# Patient Record
Sex: MALE | Race: WHITE | NOT HISPANIC OR LATINO | Employment: FULL TIME | URBAN - METROPOLITAN AREA
[De-identification: names, ages, dates, MRNs, and addresses within clinical notes are randomized per-mention and may not be internally consistent; named-entity substitution may affect disease eponyms.]

---

## 2020-02-07 ENCOUNTER — OFFICE VISIT (OUTPATIENT)
Dept: FAMILY MEDICINE CLINIC | Facility: CLINIC | Age: 45
End: 2020-02-07
Payer: COMMERCIAL

## 2020-02-07 VITALS
TEMPERATURE: 98.2 F | HEIGHT: 72 IN | SYSTOLIC BLOOD PRESSURE: 150 MMHG | RESPIRATION RATE: 18 BRPM | DIASTOLIC BLOOD PRESSURE: 92 MMHG | HEART RATE: 72 BPM | WEIGHT: 259 LBS | BODY MASS INDEX: 35.08 KG/M2

## 2020-02-07 DIAGNOSIS — Z13.1 SCREENING FOR DIABETES MELLITUS: ICD-10-CM

## 2020-02-07 DIAGNOSIS — C43.30 MALIGNANT MELANOMA OF FACE (HCC): ICD-10-CM

## 2020-02-07 DIAGNOSIS — E66.9 OBESITY (BMI 35.0-39.9 WITHOUT COMORBIDITY): ICD-10-CM

## 2020-02-07 DIAGNOSIS — R10.11 RIGHT UPPER QUADRANT PAIN: ICD-10-CM

## 2020-02-07 DIAGNOSIS — R03.0 ELEVATED BP WITHOUT DIAGNOSIS OF HYPERTENSION: ICD-10-CM

## 2020-02-07 DIAGNOSIS — Z13.6 SCREENING FOR CARDIOVASCULAR CONDITION: ICD-10-CM

## 2020-02-07 DIAGNOSIS — Z11.4 SCREENING FOR HIV (HUMAN IMMUNODEFICIENCY VIRUS): ICD-10-CM

## 2020-02-07 DIAGNOSIS — Z00.00 ANNUAL PHYSICAL EXAM: Primary | ICD-10-CM

## 2020-02-07 PROBLEM — R94.6 ABNORMAL FINDING ON THYROID FUNCTION TEST: Status: ACTIVE | Noted: 2020-02-07

## 2020-02-07 PROCEDURE — 99386 PREV VISIT NEW AGE 40-64: CPT | Performed by: FAMILY MEDICINE

## 2020-02-07 NOTE — PATIENT INSTRUCTIONS

## 2020-02-07 NOTE — ASSESSMENT & PLAN NOTE
BMI Counseling: Body mass index is 35 62 kg/m²  The BMI is above normal  Nutrition recommendations include reducing portion sizes, decreasing overall calorie intake and 3-5 servings of fruits/vegetables daily  Exercise recommendations include moderate aerobic physical activity for 150 minutes/week
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
## 2020-02-14 ENCOUNTER — HOSPITAL ENCOUNTER (OUTPATIENT)
Dept: ULTRASOUND IMAGING | Facility: CLINIC | Age: 45
Discharge: HOME | End: 2020-02-14
Payer: COMMERCIAL

## 2020-02-14 DIAGNOSIS — R10.11 RIGHT UPPER QUADRANT PAIN: ICD-10-CM

## 2020-02-14 PROCEDURE — 76700 US EXAM ABDOM COMPLETE: CPT

## 2020-02-18 LAB
ALBUMIN SERPL-MCNC: 4.5 G/DL (ref 3.6–5.1)
ALBUMIN/GLOB SERPL: 1.8 (CALC) (ref 1–2.5)
ALP SERPL-CCNC: 60 U/L (ref 36–130)
ALT SERPL-CCNC: 34 U/L (ref 9–46)
APPEARANCE UR: CLEAR
AST SERPL-CCNC: 16 U/L (ref 10–40)
BASOPHILS # BLD AUTO: 43 CELLS/UL (ref 0–200)
BASOPHILS NFR BLD AUTO: 0.5 %
BILIRUB SERPL-MCNC: 0.7 MG/DL (ref 0.2–1.2)
BILIRUB UR QL STRIP: NEGATIVE
BUN SERPL-MCNC: 17 MG/DL (ref 7–25)
BUN/CREAT SERPL: NORMAL (CALC) (ref 6–22)
CALCIUM SERPL-MCNC: 9.4 MG/DL (ref 8.6–10.3)
CHLORIDE SERPL-SCNC: 105 MMOL/L (ref 98–110)
CHOLEST SERPL-MCNC: 172 MG/DL
CHOLEST/HDLC SERPL: 5.2 (CALC)
CO2 SERPL-SCNC: 27 MMOL/L (ref 20–32)
COLOR UR: YELLOW
CREAT SERPL-MCNC: 1.19 MG/DL (ref 0.6–1.35)
EOSINOPHIL # BLD AUTO: 102 CELLS/UL (ref 15–500)
EOSINOPHIL NFR BLD AUTO: 1.2 %
ERYTHROCYTE [DISTWIDTH] IN BLOOD BY AUTOMATED COUNT: 13 % (ref 11–15)
GLOBULIN SER CALC-MCNC: 2.5 G/DL (CALC) (ref 1.9–3.7)
GLUCOSE SERPL-MCNC: 82 MG/DL (ref 65–99)
GLUCOSE UR QL STRIP: NEGATIVE
HCT VFR BLD AUTO: 44.8 % (ref 38.5–50)
HDLC SERPL-MCNC: 33 MG/DL
HGB BLD-MCNC: 15.4 G/DL (ref 13.2–17.1)
HGB UR QL STRIP: NEGATIVE
HIV 1+2 AB+HIV1 P24 AG SERPL QL IA: NORMAL
KETONES UR QL STRIP: NEGATIVE
LDLC SERPL CALC-MCNC: 114 MG/DL (CALC)
LEUKOCYTE ESTERASE UR QL STRIP: NEGATIVE
LIPASE SERPL-CCNC: 14 U/L (ref 7–60)
LYMPHOCYTES # BLD AUTO: 2202 CELLS/UL (ref 850–3900)
LYMPHOCYTES NFR BLD AUTO: 25.9 %
MCH RBC QN AUTO: 30.7 PG (ref 27–33)
MCHC RBC AUTO-ENTMCNC: 34.4 G/DL (ref 32–36)
MCV RBC AUTO: 89.4 FL (ref 80–100)
MONOCYTES # BLD AUTO: 680 CELLS/UL (ref 200–950)
MONOCYTES NFR BLD AUTO: 8 %
NEUTROPHILS # BLD AUTO: 5474 CELLS/UL (ref 1500–7800)
NEUTROPHILS NFR BLD AUTO: 64.4 %
NITRITE UR QL STRIP: NEGATIVE
NONHDLC SERPL-MCNC: 139 MG/DL (CALC)
PH UR STRIP: 6 [PH] (ref 5–8)
PLATELET # BLD AUTO: 267 THOUSAND/UL (ref 140–400)
PMV BLD REES-ECKER: 9.8 FL (ref 7.5–12.5)
POTASSIUM SERPL-SCNC: 4.3 MMOL/L (ref 3.5–5.3)
PROT SERPL-MCNC: 7 G/DL (ref 6.1–8.1)
PROT UR QL STRIP: NEGATIVE
RBC # BLD AUTO: 5.01 MILLION/UL (ref 4.2–5.8)
SL AMB EGFR AFRICAN AMERICAN: 84 ML/MIN/1.73M2
SL AMB EGFR NON AFRICAN AMERICAN: 73 ML/MIN/1.73M2
SODIUM SERPL-SCNC: 140 MMOL/L (ref 135–146)
SP GR UR STRIP: 1.01 (ref 1–1.03)
TRIGL SERPL-MCNC: 136 MG/DL
TSH SERPL-ACNC: 3.38 MIU/L (ref 0.4–4.5)
WBC # BLD AUTO: 8.5 THOUSAND/UL (ref 3.8–10.8)

## 2020-02-19 ENCOUNTER — TELEPHONE (OUTPATIENT)
Dept: FAMILY MEDICINE CLINIC | Facility: CLINIC | Age: 45
End: 2020-02-19

## 2020-02-19 DIAGNOSIS — R16.2 HEPATOMEGALY WITH SPLENOMEGALY, NOT ELSEWHERE CLASSIFIED: Primary | ICD-10-CM

## 2020-02-19 DIAGNOSIS — R93.5 ABNORMAL US (ULTRASOUND) OF ABDOMEN: ICD-10-CM

## 2020-02-19 NOTE — TELEPHONE ENCOUNTER
RADIOLOGY CALLED AND ASKED THAT YOU LOOK AT [facility] U/S OF THE ABDOMEN, THERE ARE SIGNIFICANT FINDINGS

## 2020-03-04 ENCOUNTER — CONSULT (OUTPATIENT)
Dept: GASTROENTEROLOGY | Facility: CLINIC | Age: 45
End: 2020-03-04
Payer: COMMERCIAL

## 2020-03-04 ENCOUNTER — OFFICE VISIT (OUTPATIENT)
Dept: URGENT CARE | Facility: CLINIC | Age: 45
End: 2020-03-04
Payer: COMMERCIAL

## 2020-03-04 ENCOUNTER — TELEPHONE (OUTPATIENT)
Dept: GASTROENTEROLOGY | Facility: CLINIC | Age: 45
End: 2020-03-04

## 2020-03-04 VITALS
SYSTOLIC BLOOD PRESSURE: 150 MMHG | DIASTOLIC BLOOD PRESSURE: 88 MMHG | HEART RATE: 66 BPM | BODY MASS INDEX: 36.26 KG/M2 | WEIGHT: 259 LBS | HEIGHT: 71 IN

## 2020-03-04 VITALS
RESPIRATION RATE: 16 BRPM | WEIGHT: 259 LBS | HEIGHT: 72 IN | HEART RATE: 58 BPM | DIASTOLIC BLOOD PRESSURE: 74 MMHG | SYSTOLIC BLOOD PRESSURE: 119 MMHG | BODY MASS INDEX: 35.08 KG/M2 | OXYGEN SATURATION: 97 % | TEMPERATURE: 98.5 F

## 2020-03-04 DIAGNOSIS — R10.11 RIGHT UPPER QUADRANT PAIN: Primary | ICD-10-CM

## 2020-03-04 DIAGNOSIS — R16.0 HEPATOMEGALY: ICD-10-CM

## 2020-03-04 DIAGNOSIS — R16.2 HEPATOMEGALY WITH SPLENOMEGALY, NOT ELSEWHERE CLASSIFIED: ICD-10-CM

## 2020-03-04 DIAGNOSIS — R93.5 ABNORMAL US (ULTRASOUND) OF ABDOMEN: ICD-10-CM

## 2020-03-04 DIAGNOSIS — H10.32 ACUTE BACTERIAL CONJUNCTIVITIS OF LEFT EYE: Primary | ICD-10-CM

## 2020-03-04 PROCEDURE — 3008F BODY MASS INDEX DOCD: CPT | Performed by: INTERNAL MEDICINE

## 2020-03-04 PROCEDURE — 99214 OFFICE O/P EST MOD 30 MIN: CPT | Performed by: INTERNAL MEDICINE

## 2020-03-04 PROCEDURE — G0382 LEV 3 HOSP TYPE B ED VISIT: HCPCS | Performed by: PREVENTIVE MEDICINE

## 2020-03-04 PROCEDURE — 1036F TOBACCO NON-USER: CPT | Performed by: INTERNAL MEDICINE

## 2020-03-04 RX ORDER — POLYMYXIN B SULFATE AND TRIMETHOPRIM 1; 10000 MG/ML; [USP'U]/ML
1 SOLUTION OPHTHALMIC EVERY 4 HOURS
Qty: 10 ML | Refills: 0 | Status: SHIPPED | OUTPATIENT
Start: 2020-03-04 | End: 2020-03-11

## 2020-03-04 NOTE — PROGRESS NOTES
[facility] - Outpatient Consultation  Lavetta Frankel 54 y o  male MRN: [id number]  Encounter: [id number]    ASSESSMENT AND PLAN:      1  Hepatomegaly with splenomegaly, not elsewhere classified  Abnormal abdominal ultrasound that showed hepatosplenomegaly and heterogenous liver with possible fatty liver  LFTs within normal range  Indication was for right upper quadrant abdominal pain that has been episodic for many years  Suspect fatty liver  Exclude a viral, autoimmune or metabolic etiology  Radiology advise a CT scan for better visualization     - Hepatitis B surface antibody; Future  - Hepatitis A antibody, total; Future  - Hepatitis B surface antigen; Future  - ANEUDY Screen w/ Reflex to Titer/Pattern; Future  - Anti-smooth muscle antibody, IgG; Future  - Antimitochondrial antibody; Future  - Ceruloplasmin; Future  - Iron, TIBC and Ferritin Panel; Future  - Alpha-1-antitrypsin; Future  - Hepatitis C antibody; Future  - Liver Fibrosis, Fibrotest Actitest Panel; Future  - CT abdomen pelvis w contrast; Future  - continue to abstain from alcohol  - weight reduction through diet exercise        2  Right upper quadrant pain  Chronic, intermittent right upper quadrant abdominal pain over the past 2 years  Interestingly, he has stopped drinking beer over the past 3 weeks and has noted a significant improvement in the pain  Described as more of a constant pressure sensation  Recent abdominal ultrasound showed the absence of gallstones  Consider biliary dyskinesia, alcohol related duodenitis or gastritis versus peptic ulcer disease  Exclude pancreatitis  Unlikely choledocholithiasis in the absence of elevated LFTs      - check HIDA scan  - will arrange for an upper endoscopy        Followup Appointment:  4- 6 weeks  ______________________________________________________________________    Chief Complaint   Patient presents with    Follow-up     slub us        HPI:   Gladys Sanches Gary Birch is a 54y o  year old male who presents for an abnormal abdominal ultrasound that was indicated for chronic right upper quadrant abdominal pain  The ultrasound showed no evidence of gallstones  Artifact versus small area of adenomyomatosis, hepatomegaly ends splenomegaly and possible fatty liver or focal sparing  Intermittent episodes of right upper quadrant abdominal pain for at least 2 years  He did had any previous precipitating or alleviating factors up until recently when he noticed the pain significantly improved after he discontinued alcohol  He previously drank 3 beers a day 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  Denies NSAIDs  Denies actual heartburn, early satiety, nausea, vomiting, anorexia, weight loss, melena or rectal bleeding  Denies any history of IV drug use, jaundice, hepatitis contacts, intranasal cocaine or tattoos  Denies any significant weight changes over the years  Historical Information   Past Medical History:   Diagnosis Date    Known health problems: none      History reviewed  No pertinent surgical history  Social History     Substance and Sexual Activity   Alcohol Use Yes    Alcohol/week: 2 0 standard drinks    Types: 2 Cans of beer per week    Frequency: 4 or more times a week    Drinks per session: 1 or 2     Social History     Substance and Sexual Activity   Drug Use Not on file    Comment: Denies use     Social History     Tobacco Use   Smoking Status Never Smoker   Smokeless Tobacco Never Used     Family History   Problem Relation Age of Onset    No Known Problems Mother     Diabetes Father     No Known Problems Sister     Colorectal Cancer Neg Hx     Colon polyps Neg Hx        Meds/Allergies   No current outpatient medications on file  No Known Allergies    PHYSICAL EXAM:    Blood pressure 150/88, pulse 66, height 5' 11" (1 803 m), weight 117 kg (259 lb)  Body mass index is 36 12 kg/m²    General Appearance: NAD, cooperative, alert  Eyes: Anicteric  ENT: Normocephalic, atraumatic, normal mucosa  Neck:  Supple, symmetrical, trachea midline,   Resp:  Clear to auscultation bilaterally; no rales, rhonchi or wheezing; respirations unlabored   CV:  S1 S2, Regular rate and rhythm; no murmur, rub, or gallop  GI:  Soft, non-tender, non-distended; normal bowel sounds; no masses, no organomegaly   Rectal: Deferred  Musculoskeletal: No cyanosis, clubbing or edema  Normal ROM  Skin:  No jaundice, rashes, or lesions   Heme/Lymph: No palpable cervical lymphadenopathy  Psych: Normal affect, good eye contact  Neuro: No gross deficits, AAOx3    Lab Results:   Lab Results   Component Value Date    WBC 8 5 02/14/2020    HGB 15 4 02/14/2020    HCT 44 8 02/14/2020    MCV 89 4 02/14/2020     02/14/2020     Lab Results   Component Value Date    K 4 3 02/14/2020     02/14/2020    CO2 27 02/14/2020    BUN 17 02/14/2020    CREATININE 1 19 02/14/2020    CALCIUM 9 4 02/14/2020    AST 16 02/14/2020    ALT 34 02/14/2020    ALKPHOS 60 02/14/2020     No results found for: IRON, TIBC, FERRITIN  Lab Results   Component Value Date    LIPASE 14 02/14/2020       Radiology Results:   Us Abdomen Complete    Result Date: 2/19/2020  Narrative: ABDOMEN ULTRASOUND, COMPLETE INDICATION:   R10 11: Right upper quadrant pain  COMPARISON: None TECHNIQUE:   Real-time ultrasound of the abdomen was performed with a curvilinear transducer with both volumetric sweeps and still imaging techniques  FINDINGS: PANCREAS:  The pancreas is mostly obscured by bowel gas  AORTA AND IVC:  The visualized aorta is unremarkable  The IVC is not well seen  LIVER: Size:  Mildly enlarged  The liver measures 19 3 cm in the midclavicular line  Contour:  Surface contour is smooth  Parenchyma:  Increased, heterogeneous echogenicity is seen diffusely with a suggestion of some nodular areas    This could represent fibrofatty change with areas obscured by body habitus however other pathology needs to be considered given the heterogeneity  No evidence of suspicious mass  Limited imaging of the main portal vein shows it to be patent and hepatopetal  BILIARY: No gallstone is seen  An isolated volumetric sweeps show some ringdown artifact  This could be technical although a tiny focus of adenomyomatosis could be present  No intrahepatic biliary dilatation  CBD measures 4 mm  No choledocholithiasis  KIDNEY: Right kidney measures 11 6 cm  Within normal limits  Left kidney measures 11 9 cm  An 8 mm echogenic focus noted peripherally in the left kidney as seen on volumetric sweep image 16 of series 6144 and may represent area of scarring versus an echogenic nodule  SPLEEN: Measures 14 3 cm  Mildly enlarged ASCITES:  None  Impression: No gallstone  Artifact versus small area of adenomyomatosis  Splenomegaly  Very heterogeneous, enlarged liver  Echogenic focus in the left kidney may represent a small fatty lesion or scar  Correlation with any acute viral illness is advised  Further evaluation with CT or MRI is advised  The study was marked in EPIC for significant notification with follow-up  Workstation performed: [id number]       REVIEW OF SYSTEMS:    CONSTITUTIONAL: Denies any fever, chills, rigors, and weight loss  HEENT: No earache or tinnitus  Denies hearing loss or visual disturbances  CARDIOVASCULAR: No chest pain  Positive for an irregular heart rate  RESPIRATORY: Denies any cough, hemoptysis  Positive for shortness of breath with exertion  GASTROINTESTINAL: As noted in the History of Present Illness  GENITOURINARY: No problems with urination  Denies any hematuria or dysuria  NEUROLOGIC: No dizziness or vertigo, denies headaches  MUSCULOSKELETAL: Denies any muscle or joint pain  SKIN: Denies skin rashes  Positive for facial itching  ENDOCRINE: Denies excessive thirst  Denies intolerance to heat or cold  PSYCHOSOCIAL: Denies depression or anxiety  Denies any recent memory loss

## 2020-03-04 NOTE — PROGRESS NOTES
NAME: Dayanna Yi is a 54 y o  male  : 1975    MRN: [id number]      Assessment and Plan   Acute bacterial conjunctivitis of left eye [H10 32]  1  Acute bacterial conjunctivitis of left eye  polymyxin b-trimethoprim (POLYTRIM) ophthalmic solution   Exam findings are consistent with bacterial conjunctivitis vs blepharitis  At this time will send Polytrim ophthalmic eyedrops to pharmacy  Administer medication as noted  Encourage good hand hygiene  Change pillow cases  Pt is contagious for 24 hours while on abx drops  Advise Pt to continue use of OTC Tylenol alternating with OTC Ibuprofen  If symptoms persist the next 2-3 days Pt should follow-up with PCP  Patient understands and agrees with treatment plans  Amairani Grijalva was seen today for eye problem  Diagnoses and all orders for this visit:    Acute bacterial conjunctivitis of left eye  -     polymyxin b-trimethoprim (POLYTRIM) ophthalmic solution; Administer 1 drop to the right eye every 4 (four) hours for 7 days        Patient Instructions   There are no Patient Instructions on file for this visit  Proceed to ER if symptoms worsen  Chief Complaint     Chief Complaint   Patient presents with    Eye Problem     Patient awoke this morning with Left Eye swelling and irritation         History of Present Illness     46yo Pt presents c/o eye redness  x 1 day  Pt reports eye redness and irritation  Pt admits swollen eyelids,   Pt denies gritty sensation, burning sensation, excessive tearing, itchy eyelids,  light sensitivity, blurred vision  Denies known injury to the eye  Denies trouble with vision  Denies eye pain or pain with eye movement  Denies redness or swelling around the eyes  Denies known contact with someone with pinkeye  Review of Systems   Review of Systems   Constitutional: Negative for chills, fatigue and fever  Eyes: Positive for redness  Negative for photophobia, pain, discharge, itching and visual disturbance  Respiratory: Negative  Cardiovascular: Negative  Neurological: Negative for headaches  Current Medications       Current Outpatient Medications:     polymyxin b-trimethoprim (POLYTRIM) ophthalmic solution, Administer 1 drop to the right eye every 4 (four) hours for 7 days, Disp: 10 mL, Rfl: 0    Current Allergies     Allergies as of 03/04/2020    (No Known Allergies)              Past Medical History:   Diagnosis Date    Known health problems: none     Patient denies medical problems 03/04/2020       No past surgical history on file  Family History   Problem Relation Age of Onset    No Known Problems Mother     Diabetes Father     No Known Problems Sister     Colorectal Cancer Neg Hx     Colon polyps Neg Hx          Medications have been verified  The following portions of the patient's history were reviewed and updated as appropriate: allergies, current medications, past family history, past medical history, past social history, past surgical history and problem list     Objective   /74   Pulse 58   Temp 98 5 °F (36 9 °C)   Resp 16   Ht 6' (1 829 m)   Wt 117 kg (259 lb)   SpO2 97%   BMI 35 13 kg/m²      Physical Exam     Physical Exam   Constitutional: He appears well-developed  No distress  Eyes: Pupils are equal, round, and reactive to light  EOM are normal  Lids are everted and swept, no foreign bodies found  Left conjunctiva is injected  Fundoscopic exam:       The right eye shows red reflex  The left eye shows red reflex  Cardiovascular: Normal rate, regular rhythm, normal heart sounds and intact distal pulses  Exam reveals no gallop and no friction rub  No murmur heard  Pulmonary/Chest: Effort normal and breath sounds normal  No stridor  No respiratory distress  He has no wheezes  He has no rales  He exhibits no tenderness  Nursing note and vitals reviewed        Nereida Crow PA-C

## 2020-03-10 ENCOUNTER — LAB REQUISITION (OUTPATIENT)
Dept: LAB | Facility: HOSPITAL | Age: 45
End: 2020-03-10
Payer: COMMERCIAL

## 2020-03-10 DIAGNOSIS — R10.11 RIGHT UPPER QUADRANT PAIN: ICD-10-CM

## 2020-03-10 PROCEDURE — 88305 TISSUE EXAM BY PATHOLOGIST: CPT | Performed by: PATHOLOGY

## 2020-03-11 ENCOUNTER — TELEPHONE (OUTPATIENT)
Dept: GASTROENTEROLOGY | Facility: CLINIC | Age: 45
End: 2020-03-11

## 2020-03-11 NOTE — TELEPHONE ENCOUNTER
Pt was ordered ct abd and pelvis with contrast additional info was faxed to [employer] on pt's behalf  precert denied per [employer]  Please advise

## 2020-03-12 NOTE — TELEPHONE ENCOUNTER
Left message on patient's answering machine that we need to do a HIDA before insurance will cover CT scan of the abdomen    Told him to call us back with questions

## 2020-03-17 ENCOUNTER — HOSPITAL ENCOUNTER (OUTPATIENT)
Dept: NUCLEAR MEDICINE | Facility: HOSPITAL | Age: 45
Discharge: HOME | End: 2020-03-17
Payer: COMMERCIAL

## 2020-03-17 DIAGNOSIS — R16.2 HEPATOMEGALY WITH SPLENOMEGALY, NOT ELSEWHERE CLASSIFIED: ICD-10-CM

## 2020-03-17 PROCEDURE — A9537 TC99M MEBROFENIN: HCPCS

## 2020-03-17 PROCEDURE — 78227 HEPATOBIL SYST IMAGE W/DRUG: CPT

## 2020-03-17 RX ADMIN — SINCALIDE 2.3 MCG: 5 INJECTION, POWDER, LYOPHILIZED, FOR SOLUTION INTRAVENOUS at 09:16

## 2020-03-18 ENCOUNTER — TELEPHONE (OUTPATIENT)
Dept: GASTROENTEROLOGY | Facility: CLINIC | Age: 45
End: 2020-03-18

## 2020-03-18 NOTE — TELEPHONE ENCOUNTER
Patient notified that CT scan cancelled at this time since we have not received precert approval to date  I contacted scheduling and spoke with [address]   Patient informed that we will notify him of decision and if approved will reorder test

## 2020-03-18 NOTE — TELEPHONE ENCOUNTER
I spoke with patient and informed him that we have resubmitted records to see if insurance will approve CT scan, so far it is pending   If it is approved he can complete as scheduled tomorrow, if not we will cancel test

## 2020-03-18 NOTE — TELEPHONE ENCOUNTER
[facility] tomorrow because note from Dr office said it was denied but he had a call saying it was scheduled   # [phone]

## 2020-03-19 ENCOUNTER — TELEPHONE (OUTPATIENT)
Dept: GASTROENTEROLOGY | Facility: CLINIC | Age: 45
End: 2020-03-19

## 2020-03-19 DIAGNOSIS — R16.2 HEPATOMEGALY WITH SPLENOMEGALY, NOT ELSEWHERE CLASSIFIED: Primary | ICD-10-CM

## 2020-03-19 NOTE — TELEPHONE ENCOUNTER
I spoke with Jocy Perkins at [facility] approved #[id number] effective date 3/19/20-09/15/2020  Patient notified and he will call to schedule

## 2020-03-19 NOTE — TELEPHONE ENCOUNTER
Per Faith Mcfarlane with [facility], ct abd/pelvis denied  Not medically necessary  Per their recommendation, Ct abd with contrast is appropriate  If you agree, you can call them at [phone] option 4   Please reference case # [id number]

## 2020-03-20 LAB
A1AT SERPL-MCNC: 115 MG/DL (ref 83–199)
A2 MACROGLOB SERPL-MCNC: 98 MG/DL (ref 106–279)
ALT SERPL-CCNC: 30 U/L (ref 9–46)
ANA SER QL IF: NEGATIVE
APO A-I SERPL-MCNC: 101 MG/DL (ref 94–176)
BILIRUB SERPL-MCNC: 0.6 MG/DL (ref 0.2–1.2)
CERULOPLASMIN SERPL-MCNC: 22 MG/DL (ref 18–36)
FERRITIN SERPL-MCNC: 265 NG/ML (ref 38–380)
FIBROSIS STAGE SERPL QL: ABNORMAL
GGT SERPL-CCNC: 30 U/L (ref 3–95)
HAPTOGLOB SERPL-MCNC: 91 MG/DL (ref 43–212)
HAV AB SER QL IA: ABNORMAL
HBV SURFACE AB SERPL IA-ACNC: <5 MIU/ML
HBV SURFACE AG SERPL QL IA: NORMAL
HCV AB S/CO SERPL IA: 0.01
HCV AB SERPL QL IA: NORMAL
IRON SATN MFR SERPL: 35 % (CALC) (ref 20–48)
IRON SERPL-MCNC: 101 MCG/DL (ref 50–180)
LIVER FIBR SCORE SERPL CALC.FIBROSURE: 0.13
MICRODELETION SYND BLD/T FISH: ABNORMAL
MICRODELETION SYND BLD/T FISH: ABNORMAL
MITOCHONDRIA AB SER QL IF: NEGATIVE
NECROINFLAMMATORY ACT GRADE SERPL QL: ABNORMAL
NECROINFLAMMATORY ACT SCORE SERPL: 0.12
SL AMB FOOTNOTE: ABNORMAL
SL AMB REFERENCE ID: ABNORMAL
SMOOTH MUSCLE AB SER QL IF: NEGATIVE
TIBC SERPL-MCNC: 289 MCG/DL (CALC) (ref 250–425)

## 2020-03-23 ENCOUNTER — OFFICE VISIT (OUTPATIENT)
Dept: URGENT CARE | Facility: CLINIC | Age: 45
End: 2020-03-23
Payer: COMMERCIAL

## 2020-03-23 VITALS
HEIGHT: 72 IN | HEART RATE: 70 BPM | TEMPERATURE: 98 F | BODY MASS INDEX: 34.81 KG/M2 | RESPIRATION RATE: 16 BRPM | WEIGHT: 257 LBS | OXYGEN SATURATION: 97 % | SYSTOLIC BLOOD PRESSURE: 134 MMHG | DIASTOLIC BLOOD PRESSURE: 79 MMHG

## 2020-03-23 DIAGNOSIS — H10.13 ALLERGIC CONJUNCTIVITIS OF BOTH EYES: Primary | ICD-10-CM

## 2020-03-23 PROCEDURE — G0382 LEV 3 HOSP TYPE B ED VISIT: HCPCS | Performed by: FAMILY MEDICINE

## 2020-03-23 NOTE — PATIENT INSTRUCTIONS
F/u here as needed  Most likely allergic conjunctitivitis  Stop abx  Allergy drops  Try claritin/zyrtec/allegra  F/u with PCP if no improvement

## 2020-03-23 NOTE — PROGRESS NOTES
NAME: Anna Marie Anne is a 54 y o  male  : 1975    MRN: [id number]      Assessment and Plan   Allergic conjunctivitis of both eyes [H10 13]  1  Allergic conjunctivitis of both eyes             Patient Instructions   Patient Instructions   F/u here as needed  Most likely allergic conjunctitivitis  Stop abx  Allergy drops  Try claritin/zyrtec/allegra  F/u with PCP if no improvement  Proceed to ER if symptoms worsen  Chief Complaint     Chief Complaint   Patient presents with    Eye Problem     Patient has had irritation of both eyes for about a little over two weeks, and was treated here prior with meds,a with no improvement  Eyes are both watery and upon waking up both eyes have crust          History of Present Illness   Here c/o B eye irritation  Started on 3/2-2020  Seen here in - given  polymyxiin B and using for weeks  Now spread to the R eye  No change in vision  Mild crusty in the am, watery throughout the day  No change in vision   No contacts  Review of Systems   Review of Systems   Constitutional: Negative for fatigue and fever  HENT: Negative for ear pain and trouble swallowing  Eyes: Positive for discharge, redness and itching  Negative for photophobia and visual disturbance  Respiratory: Negative for cough, chest tightness and shortness of breath  Cardiovascular: Negative for chest pain  Gastrointestinal: Negative for abdominal pain, diarrhea, nausea and vomiting  Genitourinary: Negative for difficulty urinating and dysuria  Skin: Negative for rash and wound  Neurological: Negative for weakness and headaches  Psychiatric/Behavioral: Negative for behavioral problems and confusion  Current Medications     No current outpatient medications on file      Current Allergies     Allergies as of 2020    (No Known Allergies)              Past Medical History:   Diagnosis Date    Known health problems: none     Patient denies medical problems 03/04/2020    Patient denies medical problems 03/23/2020       History reviewed  No pertinent surgical history  Family History   Problem Relation Age of Onset    No Known Problems Mother     Diabetes Father     No Known Problems Sister     Colorectal Cancer Neg Hx     Colon polyps Neg Hx          Medications have been verified  The following portions of the patient's history were reviewed and updated as appropriate: allergies, current medications, past family history, past medical history, past social history, past surgical history and problem list     Objective   /79   Pulse 70   Temp 98 °F (36 7 °C)   Resp 16   Ht 6' (1 829 m)   Wt 117 kg (257 lb)   SpO2 97%   BMI 34 86 kg/m²      Physical Exam     Physical Exam   Constitutional: He is oriented to person, place, and time  He appears well-developed and well-nourished  HENT:   Head: Normocephalic  Eyes: Pupils are equal, round, and reactive to light  Conjunctivae and EOM are normal    Neck: Normal range of motion  Cardiovascular: Normal rate, regular rhythm and normal heart sounds  Exam reveals no gallop and no friction rub  No murmur heard  Pulmonary/Chest: Effort normal and breath sounds normal  No respiratory distress  He has no wheezes  He has no rales  Abdominal: Soft  Bowel sounds are normal  He exhibits no distension  There is no tenderness  There is no rebound and no guarding  Musculoskeletal: Normal range of motion  Neurological: He is oriented to person, place, and time  Skin: Skin is warm and dry  No rash noted  Psychiatric: He has a normal mood and affect  Thought content normal    Nursing note and vitals reviewed

## 2020-03-26 NOTE — RESULT ENCOUNTER NOTE
Spoke with patient and results of blood work given  He was told he needs to receive vaccinations for hepatitis A and B from his primary care physician and he will do so when Leslie Kingston has settled down

## 2020-03-28 ENCOUNTER — HOSPITAL ENCOUNTER (OUTPATIENT)
Dept: CT IMAGING | Facility: HOSPITAL | Age: 45
Discharge: HOME | End: 2020-03-28
Payer: COMMERCIAL

## 2020-03-28 DIAGNOSIS — R16.2 HEPATOMEGALY WITH SPLENOMEGALY, NOT ELSEWHERE CLASSIFIED: ICD-10-CM

## 2020-03-28 PROCEDURE — 74160 CT ABDOMEN W/CONTRAST: CPT

## 2020-03-28 RX ADMIN — IOHEXOL 100 ML: 350 INJECTION, SOLUTION INTRAVENOUS at 08:55

## 2020-03-28 NOTE — LETTER
[address] [place] [address]  [address] [facility] [address]      March 31, 2020    MRN: [id number]     Phone: [phone]     Dear Mr Melinda Arnold recently had a(n) Cat Scan performed on 3/28/2020 at  [address] [place] [address] that was requested by Melonie Little  The study was reviewed by a radiologist, which is a physician who specializes in medical imaging  The radiologist issued a report describing his or her findings  In that report there was a finding that the radiologist felt warranted further discussion with your health care provider and that discussion would be beneficial to you  The results were sent to Kendell Benites on 3/30/2020  We recommend that you contact Kendell Benites at [phone] or set up an appointment to discuss the results of the imaging test  If you have already heard from Melonie Little regarding the results of your study, you can disregard this letter  This letter is not meant to alarm you, but intended to encourage you to follow-up on your results with the provider that sent you for the imaging study  In addition, we have enclosed answers to frequently asked questions by other patients who have also received a letter to review results with their health care provider (see page two)  Thank you for choosing [address] [place] [address] for your medical imaging needs  FREQUENTLY ASKED QUESTIONS    1  Why am I receiving this letter? [facility] requires us to notify patients who have findings on imaging exams that may require more testing or follow-up with a health professional within the next 3 months          2  How serious is the finding on the imaging test?  This letter is sent to all patients who may need follow-up or more testing within the next 3 months  Receiving this letter does not necessarily mean you have a life-threatening imaging finding or disease  Recommendations in the radiologists imaging report are general in nature and it is up to your healthcare provider to say whether those recommendations make sense for your situation  You are strongly encouraged to talk to your health care provider about the results and ask whether additional steps need to be taken  3  Where can I get a copy of the final report for my recent radiology exam?  To get a full copy of the report you can access your records online at [email or web address] or please contact [address] [place] [address] Records Department at [phone] Monday through Friday between 8 am and 6 pm          4  What do I need to do now? Please contact your health care provider who requested the imaging study to discuss what further actions (if any) are needed  You may have already heard from (your ordering provider) in regard to this test in which case you can disregard this letter  NOTICE IN ACCORDANCE WITH THE [facility] PATIENT TEST RESULT INFORMATION ACT OF 2018    You are receiving this notice as a result of a determination by your diagnostic imaging service that further discussions of your test results are warranted and would be beneficial to you  The complete results of your test or tests have been or will be sent to the health care practitioner that ordered the test or tests  It is recommended that you contact your health care practitioner to discuss your results as soon as possible

## 2020-03-30 ENCOUNTER — TELEPHONE (OUTPATIENT)
Dept: GASTROENTEROLOGY | Facility: CLINIC | Age: 45
End: 2020-03-30

## 2020-03-30 NOTE — TELEPHONE ENCOUNTER
Sarah Galan from [address] [place] [address] Radiology [facility] CT scan was read today/done the 28th and it showed significant findings

## 2020-03-30 NOTE — TELEPHONE ENCOUNTER
Santosh Dominguez from [employer] asks for call from [place] to Radiologist re: CT scan today  CB# [phone] and she will connect call

## 2020-03-30 NOTE — TELEPHONE ENCOUNTER
Spoke to the radiologist in addition to patient  Patient has no pain in the right lower quadrant and is afebrile  No leukocytosis and CBC last month  Pain in right upper quadrant and has been intermittent for the past 2 years  He is not presenting like an appendicitis  I sent a message to Dr Romie Alcocer to see if he would like to see pt in the office or if he just wants to repeat a CT scan in a few weeks

## 2020-03-31 ENCOUNTER — TELEPHONE (OUTPATIENT)
Dept: GASTROENTEROLOGY | Facility: CLINIC | Age: 45
End: 2020-03-31

## 2020-03-31 DIAGNOSIS — K37 APPENDICITIS, UNSPECIFIED APPENDICITIS TYPE: Primary | ICD-10-CM

## 2020-03-31 NOTE — TELEPHONE ENCOUNTER
Please see notes under results of CT scan  Repeat CT scan in 8 weeks with p o  and IV contrast attention appendix  If appendix remains inflamed will send to surgery  Clinically, patient without any signs of an acute appendicitis

## 2020-03-31 NOTE — RESULT ENCOUNTER NOTE
CT scan results given to patient  Per Dr Peter Helms message, he recommend a repeat CT scan with IV and oral contrast in 8 weeks  Left message on patient's answering machine to repeat CT scan of abdomen with IV and p o  Contrast in 8 weeks and that we will send to [facility]

## 2020-04-28 ENCOUNTER — TRANSCRIBE ORDERS (OUTPATIENT)
Dept: ADMINISTRATIVE | Facility: HOSPITAL | Age: 45
End: 2020-04-28

## 2020-04-28 ENCOUNTER — TELEPHONE (OUTPATIENT)
Dept: GASTROENTEROLOGY | Facility: CLINIC | Age: 45
End: 2020-04-28

## 2020-04-28 DIAGNOSIS — K37 APPENDICITIS, UNSPECIFIED APPENDICITIS TYPE: Primary | ICD-10-CM

## 2020-05-23 ENCOUNTER — HOSPITAL ENCOUNTER (OUTPATIENT)
Dept: CT IMAGING | Facility: HOSPITAL | Age: 45
Discharge: HOME | End: 2020-05-23
Payer: COMMERCIAL

## 2020-05-23 DIAGNOSIS — K37 APPENDICITIS, UNSPECIFIED APPENDICITIS TYPE: ICD-10-CM

## 2020-05-23 PROCEDURE — 74177 CT ABD & PELVIS W/CONTRAST: CPT

## 2020-05-23 RX ADMIN — IOHEXOL 100 ML: 350 INJECTION, SOLUTION INTRAVENOUS at 08:40

## 2020-06-15 ENCOUNTER — TELEPHONE (OUTPATIENT)
Dept: GASTROENTEROLOGY | Facility: CLINIC | Age: 45
End: 2020-06-15

## 2020-06-15 ENCOUNTER — CONSULT (OUTPATIENT)
Dept: SURGERY | Facility: HOSPITAL | Age: 45
End: 2020-06-15
Payer: COMMERCIAL

## 2020-06-15 VITALS
SYSTOLIC BLOOD PRESSURE: 135 MMHG | WEIGHT: 255.6 LBS | DIASTOLIC BLOOD PRESSURE: 81 MMHG | HEIGHT: 73 IN | BODY MASS INDEX: 33.88 KG/M2 | TEMPERATURE: 97.9 F | RESPIRATION RATE: 16 BRPM | HEART RATE: 53 BPM

## 2020-06-15 DIAGNOSIS — K38.8 MUCOCELE OF APPENDIX: Primary | ICD-10-CM

## 2020-06-15 PROCEDURE — 99203 OFFICE O/P NEW LOW 30 MIN: CPT | Performed by: SURGERY

## 2020-07-02 ENCOUNTER — CLINICAL SUPPORT (OUTPATIENT)
Dept: GASTROENTEROLOGY | Facility: CLINIC | Age: 45
End: 2020-07-02

## 2020-07-02 VITALS — HEIGHT: 73 IN | BODY MASS INDEX: 33.8 KG/M2 | WEIGHT: 255 LBS

## 2020-07-02 DIAGNOSIS — K37 APPENDICITIS, UNSPECIFIED APPENDICITIS TYPE: Primary | ICD-10-CM

## 2020-07-02 NOTE — PROGRESS NOTES
Phone prep for colon dx abd pain med's reviewed instructions given for suprep Rx sent to pharmacy  Instructions emailed to pt   Pt aware of covid test

## 2020-07-03 DIAGNOSIS — Z11.59 SCREENING FOR VIRAL DISEASE: ICD-10-CM

## 2020-07-03 PROCEDURE — U0003 INFECTIOUS AGENT DETECTION BY NUCLEIC ACID (DNA OR RNA); SEVERE ACUTE RESPIRATORY SYNDROME CORONAVIRUS 2 (SARS-COV-2) (CORONAVIRUS DISEASE [COVID-19]), AMPLIFIED PROBE TECHNIQUE, MAKING USE OF HIGH THROUGHPUT TECHNOLOGIES AS DESCRIBED BY CMS-2020-01-R: HCPCS

## 2020-07-06 LAB — SARS-COV-2 RNA SPEC QL NAA+PROBE: NOT DETECTED

## 2020-07-09 ENCOUNTER — HOSPITAL ENCOUNTER (OUTPATIENT)
Dept: GASTROENTEROLOGY | Facility: AMBULATORY SURGERY CENTER | Age: 45
Discharge: HOME | End: 2020-07-09
Payer: COMMERCIAL

## 2020-07-09 ENCOUNTER — ANESTHESIA (OUTPATIENT)
Dept: GASTROENTEROLOGY | Facility: AMBULATORY SURGERY CENTER | Age: 45
End: 2020-07-09

## 2020-07-09 ENCOUNTER — ANESTHESIA EVENT (OUTPATIENT)
Dept: GASTROENTEROLOGY | Facility: AMBULATORY SURGERY CENTER | Age: 45
End: 2020-07-09

## 2020-07-09 VITALS
TEMPERATURE: 97.6 F | SYSTOLIC BLOOD PRESSURE: 115 MMHG | RESPIRATION RATE: 19 BRPM | OXYGEN SATURATION: 95 % | DIASTOLIC BLOOD PRESSURE: 76 MMHG | HEART RATE: 65 BPM

## 2020-07-09 DIAGNOSIS — Z01.818 OTHER SPECIFIED PRE-OPERATIVE EXAMINATION: ICD-10-CM

## 2020-07-09 PROCEDURE — 88305 TISSUE EXAM BY PATHOLOGIST: CPT | Performed by: PATHOLOGY

## 2020-07-09 PROCEDURE — 45385 COLONOSCOPY W/LESION REMOVAL: CPT | Performed by: INTERNAL MEDICINE

## 2020-07-09 RX ORDER — PROPOFOL 10 MG/ML
INJECTION, EMULSION INTRAVENOUS AS NEEDED
Status: DISCONTINUED | OUTPATIENT
Start: 2020-07-09 | End: 2020-07-09 | Stop reason: SURG

## 2020-07-09 RX ORDER — SODIUM CHLORIDE 9 MG/ML
50 INJECTION, SOLUTION INTRAVENOUS ONCE
Status: COMPLETED | OUTPATIENT
Start: 2020-07-09 | End: 2020-07-09

## 2020-07-09 RX ORDER — SODIUM CHLORIDE 9 MG/ML
INJECTION, SOLUTION INTRAVENOUS CONTINUOUS PRN
Status: DISCONTINUED | OUTPATIENT
Start: 2020-07-09 | End: 2020-07-09 | Stop reason: SURG

## 2020-07-09 RX ADMIN — PROPOFOL 30 MG: 10 INJECTION, EMULSION INTRAVENOUS at 09:20

## 2020-07-09 RX ADMIN — PROPOFOL 30 MG: 10 INJECTION, EMULSION INTRAVENOUS at 09:12

## 2020-07-09 RX ADMIN — PROPOFOL 100 MG: 10 INJECTION, EMULSION INTRAVENOUS at 09:09

## 2020-07-09 RX ADMIN — PROPOFOL 30 MG: 10 INJECTION, EMULSION INTRAVENOUS at 09:25

## 2020-07-09 RX ADMIN — PROPOFOL 30 MG: 10 INJECTION, EMULSION INTRAVENOUS at 09:10

## 2020-07-09 RX ADMIN — PROPOFOL 30 MG: 10 INJECTION, EMULSION INTRAVENOUS at 09:16

## 2020-07-09 RX ADMIN — PROPOFOL 30 MG: 10 INJECTION, EMULSION INTRAVENOUS at 09:14

## 2020-07-09 RX ADMIN — PROPOFOL 20 MG: 10 INJECTION, EMULSION INTRAVENOUS at 09:19

## 2020-07-09 RX ADMIN — PROPOFOL 20 MG: 10 INJECTION, EMULSION INTRAVENOUS at 09:24

## 2020-07-09 RX ADMIN — PROPOFOL 30 MG: 10 INJECTION, EMULSION INTRAVENOUS at 09:18

## 2020-07-09 RX ADMIN — PROPOFOL 20 MG: 10 INJECTION, EMULSION INTRAVENOUS at 09:21

## 2020-07-09 RX ADMIN — PROPOFOL 30 MG: 10 INJECTION, EMULSION INTRAVENOUS at 09:23

## 2020-07-09 RX ADMIN — SODIUM CHLORIDE: 9 INJECTION, SOLUTION INTRAVENOUS at 09:05

## 2020-07-09 RX ADMIN — SODIUM CHLORIDE 50 ML/HR: 9 INJECTION, SOLUTION INTRAVENOUS at 08:36

## 2020-07-09 NOTE — ANESTHESIA PREPROCEDURE EVALUATION
Review of Systems/Medical History  Patient summary reviewed  Chart reviewed  No history of anesthetic complications     Cardiovascular  Negative cardio ROS Exercise tolerance (METS): >4,     Pulmonary  Sleep apnea ,        GI/Hepatic    Liver disease , Bowel prep       Negative  ROS        Endo/Other  Negative endo/other ROS      GYN       Hematology  Negative hematology ROS      Musculoskeletal  Negative musculoskeletal ROS        Neurology  Negative neurology ROS      Psychology   Negative psychology ROS              Physical Exam    Airway    Mallampati score: III  TM Distance: >3 FB  Neck ROM: full     Dental   No notable dental hx     Cardiovascular  Comment: Negative ROS, Rhythm: regular, Rate: normal, Cardiovascular exam normal    Pulmonary  Pulmonary exam normal Breath sounds clear to auscultation,     Other Findings        Anesthesia Plan  ASA Score- 2     Anesthesia Type- IV sedation with anesthesia with ASA Monitors  Additional Monitors:   Airway Plan:         Plan Factors-    Induction- intravenous  Postoperative Plan-     Informed Consent- Anesthetic plan and risks discussed with patient

## 2020-07-09 NOTE — ANESTHESIA POSTPROCEDURE EVALUATION
Post-Op Assessment Note    CV Status:  Stable  Pain Score: 0    Pain management: adequate     Mental Status:  Alert and sleepy   Hydration Status:  Euvolemic   PONV Controlled:  Controlled   Airway Patency:  Patent   Post Op Vitals Reviewed: Yes      Staff: Anesthesiologist           BP      Temp      Pulse     Resp      SpO2

## 2020-07-09 NOTE — DISCHARGE INSTRUCTIONS
Colorectal Polyps   WHAT YOU NEED TO KNOW:   What are colorectal polyps? Colorectal polyps are small growths of tissue in the lining of the colon and rectum  Most polyps are hyperplastic polyps and are usually benign (noncancerous)  Certain types of polyps, called adenomatous polyps, may turn into cancer  What increases my risk of colorectal polyps? The exact cause of colorectal polyps is unknown  The following may increase your risk:  · Older age    · A diet of foods high in fat and low in fiber     · Family history of polyps    · Intestinal diseases, such as Crohn's disease or ulcerative colitis    · An unhealthy lifestyle, such as physical inactivity, smoking, or drinking alcohol    · Obesity  What are the signs and symptoms of colorectal polyps? · Blood in your bowel movement or bleeding from the rectum    · Change in bowel movement habits, such as diarrhea and constipation    · Abdominal pain  How are colorectal polyps diagnosed? You should have fecal blood screening once a year for colorectal disease if you are over 48years old  You should be screened earlier if you have an intestinal disease or a family history of polyps or colorectal cancer  During this screening, a sample of your bowel movement is checked for blood, which may be an early sign of colorectal polyps or cancer  You may also need any of the following tests:  · Digital rectal exam:  Your healthcare provider will examine your anus and use a finger to check your rectum for polyps  · Barium enema: A barium enema is an x-ray of the colon  A tube is put into your anus, and a liquid called barium is put through the tube  Barium is used so that caregivers can see your colon better on the x-ray film  · Virtual colonoscopy: This is a CT scan that takes pictures of the inside of your colon and rectum  A small, flexible tube is put into your rectum and air or carbon dioxide (gas) is used to expand your colon   This lets healthcare providers clearly see your colon and any polyps on a monitor  · Colonoscopy or sigmoidoscopy: These procedures help your healthcare provider see the inside of your colon using a flexible tube with a small light and camera on the end  During a sigmoidoscopy, your healthcare provider will only look at rectum and lower colon  During a colonoscopy, healthcare providers will look at the full length of your colon  Healthcare providers may remove a small amount of tissue from the colon for a biopsy  How are colorectal polyps treated? · Polyp removal:  Polyps may be removed during your sigmoidoscopy or colonoscopy  · Polypectomy: This is surgery to remove your polyps  You may need laparoscopic or open surgery, depending on the type, size, and number of polyps that you have  Laparoscopy is done by inserting a small, flexible scope into incisions made on your abdomen  Open surgery is done by making a larger incision on your abdomen   What are the risks of colorectal polyps? You may bleed during a colonoscopy procedure  Your bowel may be perforated (torn) when polyps are removed  This may lead to an open abdominal surgery  During surgery, you may bleed too much or get an infection  Adenomatous polyps that are not removed may turn into cancer and become more difficult to treat  Where can I find support and more information? · Kurt Mallory ([facility])  [address], [place] [address]  Phone: [phone]  Web Address: Teto Nettles  [facility] nih gov  When should I contact my healthcare provider? · You have a fever  · You have chills, a cough, or feel weak and achy  · You have abdominal pain that does not go away or gets worse after you take medicine  · Your abdomen is swollen  · You are losing weight without trying  · You have questions or concerns about your condition or care  When should I seek immediate care or call 911? · You have sudden shortness of breath  · You have a fast heart rate, fast breathing, or are too dizzy to stand up  · You have severe abdominal pain  · You see blood in your bowel movement  CARE AGREEMENT:   You have the right to help plan your care  Learn about your health condition and how it may be treated  Discuss treatment options with your caregivers to decide what care you want to receive  You always have the right to refuse treatment  The above information is an  only  It is not intended as medical advice for individual conditions or treatments  Talk to your doctor, nurse or pharmacist before following any medical regimen to see if it is safe and effective for you  © 2017 [address]  Information is for End User's use only and may not be sold, redistributed or otherwise used for commercial purposes  All illustrations and images included in CareNotes® are the copyrighted property of A D A M , Inc  or Zechariah Shah  Hemorrhoids   WHAT YOU NEED TO KNOW:   What are hemorrhoids? Hemorrhoids are swollen blood vessels inside your rectum (internal hemorrhoids) or on your anus (external hemorrhoids)  Sometimes a hemorrhoid may prolapse  This means it extends out of your anus  What increases my risk for hemorrhoids? · Pregnancy or obesity    · Straining or sitting for a long time during bowel movements    · Liver disease    · Weak muscles around the anus caused by older age, rectal surgery, or anal intercourse    · A lack of physical activity    · Chronic diarrhea or constipation    · A low-fiber diet  What are the signs and symptoms of hemorrhoids?    · Pain or itching around your anus or inside your rectum    · Swelling or bumps around your anus    · Bright red blood in your bowel movement, on the toilet paper, or in the toilet bowl    · Tissue bulging out of your anus (prolapsed hemorrhoids)    · Incontinence (poor control over urine or bowel movements)  How are hemorrhoids diagnosed? Your healthcare provider will ask about your symptoms, the foods you eat, and your bowel movements  He will examine your anus for external hemorrhoids  You may need the following:  · A digital rectal exam  is a test to check for hemorrhoids  Your healthcare provider will put a gloved finger inside your anus to feel for the hemorrhoids  · An anoscopy  is a test that uses a scope (small tube with a light and camera on the end) to look at your hemorrhoids  How are hemorrhoids treated? Treatment will depend on your symptoms  You may need any of the following:  · Medicines  can help decrease pain and swelling, and soften your bowel movement  The medicine may be a pill, pad, cream, or ointment  · Procedures  may be used to shrink or remove your hemorrhoid  Examples include rubber-band ligation, sclerotherapy, and photocoagulation  These procedures may be done in your healthcare provider's office  Ask your healthcare provider for more information about these procedures  · Surgery  may be needed to shrink or remove your hemorrhoids  How can I manage my symptoms? · Apply ice on your anus for 15 to 20 minutes every hour or as directed  Use an ice pack, or put crushed ice in a plastic bag  Cover it with a towel before you apply it to your anus  Ice helps prevent tissue damage and decreases swelling and pain  · Take a sitz bath  Fill a bathtub with 4 to 6 inches of warm water  You may also use a sitz bath pan that fits inside a toilet bowl  Sit in the sitz bath for 15 minutes  Do this 3 times a day, and after each bowel movement  The warm water can help decrease pain and swelling  · Keep your anal area clean  Gently wash the area with warm water daily  Soap may irritate the area  After a bowel movement, wipe with moist towelettes or wet toilet paper  Dry toilet paper can irritate the area  How can I help prevent hemorrhoids?    · Do not strain to have a bowel movement  Do not sit on the toilet too long  These actions can increase pressure on the tissues in your rectum and anus  · Drink plenty of liquids  Liquids can help prevent constipation  Ask how much liquid to drink each day and which liquids are best for you  · Eat a variety of high-fiber foods  Examples include fruits, vegetables, and whole grains  Ask your healthcare provider how much fiber you need each day  You may need to take a fiber supplement  · Exercise as directed  Exercise, such as walking, may make it easier to have a bowel movement  Ask your healthcare provider to help you create an exercise plan  · Do not have anal sex  Anal sex can weaken the skin around your rectum and anus  · Avoid heavy lifting  This can cause straining and increase your risk for another hemorrhoid  When should I seek immediate care? · You have severe pain in your rectum or around your anus  · You have severe pain in your abdomen and you are vomiting  · You have bleeding from your anus that soaks through your underwear  When should I contact my healthcare provider? · You have frequent and painful bowel movements  · Your hemorrhoid looks or feels more swollen than usual      · You do not have a bowel movement for 2 days or more  · You see or feel tissue coming through your anus  · You have questions or concerns about your condition or care  CARE AGREEMENT:   You have the right to help plan your care  Learn about your health condition and how it may be treated  Discuss treatment options with your caregivers to decide what care you want to receive  You always have the right to refuse treatment  The above information is an  only  It is not intended as medical advice for individual conditions or treatments  Talk to your doctor, nurse or pharmacist before following any medical regimen to see if it is safe and effective for you    © 2017 [facility] Information is for End User's use only and may not be sold, redistributed or otherwise used for commercial purposes  All illustrations and images included in CareNotes® are the copyrighted property of A D A M , Inc  or Zechariah Shah

## 2020-07-09 NOTE — H&P
History and Physical - [facility]  Patricia Frost 54 y o  male MRN: [id number]    HPI: Patricia Frost is a 54y o  year old male who presents for colonoscopy secondary to right-sided abdominal pain and abnormal CT scan    REVIEW OF SYSTEMS: Per the HPI, and otherwise unremarkable  Historical Information   Past Medical History:   Diagnosis Date    Known health problems: none     Patient denies medical problems 03/04/2020    Patient denies medical problems 03/23/2020     Past Surgical History:   Procedure Laterality Date    EGD       Social History   Social History     Substance and Sexual Activity   Alcohol Use Yes    Alcohol/week: 2 0 standard drinks    Types: 2 Cans of beer per week    Frequency: 2-3 times a week    Drinks per session: 1 or 2     Social History     Substance and Sexual Activity   Drug Use Not on file    Comment: Denies use     Social History     Tobacco Use   Smoking Status Never Smoker   Smokeless Tobacco Never Used     Family History   Problem Relation Age of Onset    No Known Problems Mother     Diabetes Father     No Known Problems Sister     Colorectal Cancer Neg Hx     Colon polyps Neg Hx        Meds/Allergies       Current Outpatient Medications:     Na Sulfate-K Sulfate-Mg Sulf (Suprep Bowel Prep Kit) 17 5-3 13-1 6 GM/177ML SOLN  No current facility-administered medications for this encounter  No Known Allergies    Objective     /66   Pulse 71   Temp 97 6 °F (36 4 °C) (Temporal)   Resp 16   SpO2 96%     PHYSICAL EXAM    Gen: NAD AAOx3  CV: S1S2 RRR no m/r/g  CHEST: Clear b/l no c/r/w  ABD: soft, +BS NT/ND  EXT: no edema    ASSESSMENT/PLAN:  This is a 54y o  year old male here for colonoscopy secondary to right-sided abdominal pain and abnormal appendix on CT scan, and he is stable and optimized for his procedure

## 2020-07-16 ENCOUNTER — OFFICE VISIT (OUTPATIENT)
Dept: SURGERY | Facility: HOSPITAL | Age: 45
End: 2020-07-16
Payer: COMMERCIAL

## 2020-07-16 ENCOUNTER — APPOINTMENT (OUTPATIENT)
Dept: LAB | Facility: HOSPITAL | Age: 45
End: 2020-07-16
Attending: SURGERY
Payer: COMMERCIAL

## 2020-07-16 ENCOUNTER — TRANSCRIBE ORDERS (OUTPATIENT)
Dept: ADMINISTRATIVE | Facility: HOSPITAL | Age: 45
End: 2020-07-16

## 2020-07-16 VITALS
HEART RATE: 58 BPM | RESPIRATION RATE: 16 BRPM | SYSTOLIC BLOOD PRESSURE: 118 MMHG | BODY MASS INDEX: 34.22 KG/M2 | DIASTOLIC BLOOD PRESSURE: 77 MMHG | HEIGHT: 73 IN | WEIGHT: 258.2 LBS | TEMPERATURE: 98.2 F

## 2020-07-16 DIAGNOSIS — K42.9 UMBILICAL HERNIA WITHOUT OBSTRUCTION OR GANGRENE: ICD-10-CM

## 2020-07-16 DIAGNOSIS — K38.8 MUCOCELE OF APPENDIX: ICD-10-CM

## 2020-07-16 DIAGNOSIS — D37.3 NEOPLASM OF UNCERTAIN OR UNKNOWN BEHAVIOR OF APPENDIX: Primary | ICD-10-CM

## 2020-07-16 DIAGNOSIS — D37.3 NEOPLASM OF UNCERTAIN OR UNKNOWN BEHAVIOR OF APPENDIX: ICD-10-CM

## 2020-07-16 DIAGNOSIS — D37.3 APPENDICEAL TUMOR: Primary | ICD-10-CM

## 2020-07-16 LAB
ALBUMIN SERPL BCP-MCNC: 3.9 G/DL (ref 3.5–5)
ALP SERPL-CCNC: 63 U/L (ref 46–116)
ALT SERPL W P-5'-P-CCNC: 37 U/L (ref 12–78)
ANION GAP SERPL CALCULATED.3IONS-SCNC: 3 MMOL/L (ref 4–13)
AST SERPL W P-5'-P-CCNC: 16 U/L (ref 5–45)
BASOPHILS # BLD AUTO: 0.05 THOUSANDS/ΜL (ref 0–0.1)
BASOPHILS NFR BLD AUTO: 1 % (ref 0–1)
BILIRUB SERPL-MCNC: 0.7 MG/DL (ref 0.2–1)
BUN SERPL-MCNC: 13 MG/DL (ref 5–25)
CALCIUM SERPL-MCNC: 9 MG/DL (ref 8.3–10.1)
CHLORIDE SERPL-SCNC: 106 MMOL/L (ref 100–108)
CO2 SERPL-SCNC: 28 MMOL/L (ref 21–32)
CREAT SERPL-MCNC: 1.23 MG/DL (ref 0.6–1.3)
EOSINOPHIL # BLD AUTO: 0.17 THOUSAND/ΜL (ref 0–0.61)
EOSINOPHIL NFR BLD AUTO: 2 % (ref 0–6)
ERYTHROCYTE [DISTWIDTH] IN BLOOD BY AUTOMATED COUNT: 12.8 % (ref 11.6–15.1)
GFR SERPL CREATININE-BSD FRML MDRD: 70 ML/MIN/1.73SQ M
GLUCOSE P FAST SERPL-MCNC: 93 MG/DL (ref 65–99)
HCT VFR BLD AUTO: 44.4 % (ref 36.5–49.3)
HGB BLD-MCNC: 15.3 G/DL (ref 12–17)
IMM GRANULOCYTES # BLD AUTO: 0.02 THOUSAND/UL (ref 0–0.2)
IMM GRANULOCYTES NFR BLD AUTO: 0 % (ref 0–2)
LYMPHOCYTES # BLD AUTO: 2.18 THOUSANDS/ΜL (ref 0.6–4.47)
LYMPHOCYTES NFR BLD AUTO: 26 % (ref 14–44)
MCH RBC QN AUTO: 30.9 PG (ref 26.8–34.3)
MCHC RBC AUTO-ENTMCNC: 34.5 G/DL (ref 31.4–37.4)
MCV RBC AUTO: 90 FL (ref 82–98)
MONOCYTES # BLD AUTO: 0.95 THOUSAND/ΜL (ref 0.17–1.22)
MONOCYTES NFR BLD AUTO: 11 % (ref 4–12)
NEUTROPHILS # BLD AUTO: 5.06 THOUSANDS/ΜL (ref 1.85–7.62)
NEUTS SEG NFR BLD AUTO: 60 % (ref 43–75)
NRBC BLD AUTO-RTO: 0 /100 WBCS
PLATELET # BLD AUTO: 236 THOUSANDS/UL (ref 149–390)
PMV BLD AUTO: 9.3 FL (ref 8.9–12.7)
POTASSIUM SERPL-SCNC: 4.5 MMOL/L (ref 3.5–5.3)
PROT SERPL-MCNC: 7.2 G/DL (ref 6.4–8.2)
RBC # BLD AUTO: 4.95 MILLION/UL (ref 3.88–5.62)
SODIUM SERPL-SCNC: 137 MMOL/L (ref 136–145)
WBC # BLD AUTO: 8.43 THOUSAND/UL (ref 4.31–10.16)

## 2020-07-16 PROCEDURE — 1036F TOBACCO NON-USER: CPT | Performed by: SURGERY

## 2020-07-16 PROCEDURE — 99213 OFFICE O/P EST LOW 20 MIN: CPT | Performed by: SURGERY

## 2020-07-16 PROCEDURE — 36415 COLL VENOUS BLD VENIPUNCTURE: CPT

## 2020-07-16 PROCEDURE — 80053 COMPREHEN METABOLIC PANEL: CPT

## 2020-07-16 PROCEDURE — 85025 COMPLETE CBC W/AUTO DIFF WBC: CPT

## 2020-07-17 DIAGNOSIS — K42.9 UMBILICAL HERNIA WITHOUT OBSTRUCTION OR GANGRENE: ICD-10-CM

## 2020-07-17 DIAGNOSIS — D37.3 APPENDICEAL TUMOR: ICD-10-CM

## 2020-07-17 PROCEDURE — U0003 INFECTIOUS AGENT DETECTION BY NUCLEIC ACID (DNA OR RNA); SEVERE ACUTE RESPIRATORY SYNDROME CORONAVIRUS 2 (SARS-COV-2) (CORONAVIRUS DISEASE [COVID-19]), AMPLIFIED PROBE TECHNIQUE, MAKING USE OF HIGH THROUGHPUT TECHNOLOGIES AS DESCRIBED BY CMS-2020-01-R: HCPCS

## 2020-07-18 LAB
INPATIENT: NORMAL
SARS-COV-2 RNA SPEC QL NAA+PROBE: NOT DETECTED

## 2020-07-21 DIAGNOSIS — D37.3 APPENDICEAL TUMOR: Primary | ICD-10-CM

## 2020-07-21 DIAGNOSIS — D37.3 APPENDICEAL TUMOR: ICD-10-CM

## 2020-07-21 PROCEDURE — U0003 INFECTIOUS AGENT DETECTION BY NUCLEIC ACID (DNA OR RNA); SEVERE ACUTE RESPIRATORY SYNDROME CORONAVIRUS 2 (SARS-COV-2) (CORONAVIRUS DISEASE [COVID-19]), AMPLIFIED PROBE TECHNIQUE, MAKING USE OF HIGH THROUGHPUT TECHNOLOGIES AS DESCRIBED BY CMS-2020-01-R: HCPCS

## 2020-07-22 LAB
INPATIENT: NORMAL
SARS-COV-2 RNA SPEC QL NAA+PROBE: NOT DETECTED

## 2020-07-22 RX ORDER — CEFAZOLIN SODIUM 2 G/50ML
2000 SOLUTION INTRAVENOUS ONCE
Status: CANCELLED | OUTPATIENT
Start: 2020-07-28

## 2020-07-22 RX ORDER — SODIUM CHLORIDE, SODIUM LACTATE, POTASSIUM CHLORIDE, CALCIUM CHLORIDE 600; 310; 30; 20 MG/100ML; MG/100ML; MG/100ML; MG/100ML
125 INJECTION, SOLUTION INTRAVENOUS CONTINUOUS
Status: CANCELLED | OUTPATIENT
Start: 2020-07-28

## 2020-07-22 NOTE — PROGRESS NOTES
Assessment/Plan:   Huma Bang is a 54 y o male who is here for Follow-up (F/U  CT done that showed abnormal appendix  Colonoscopy done by [facility] 7/9  WNL  To discuss possible appendectomy)    Plan: Appendiceal mucocele - colonoscopy normal, CT stable, discussed with patient that he would need continue survellience or consider appendectomy, patient wants surgery, discussed risks and benefits and operative approach and patietn understands and I will schedule for laparoscopic appendectomy    Umbilical hernia - will repair at time of hernia    Preoperative Clearance: None    HPI:  Huma Bang is a 54 y o male who was referred for evaluation of Follow-up (F/U  CT done that showed abnormal appendix  Colonoscopy done by [facility] 7/9  WN  To discuss possible appendectomy)    Currently no symptoms or changes since last visit  ROS:  General ROS: negative  negative for - chills, fatigue, fever or night sweats, weight loss  Respiratory ROS: no cough, shortness of breath, or wheezing  Cardiovascular ROS: no chest pain or dyspnea on exertion  Genito-Urinary ROS: no dysuria, trouble voiding, or hematuria  Musculoskeletal ROS: negative for - gait disturbance, joint pain or muscle pain  Neurological ROS: no TIA or stroke symptoms  GI: no sxs    [unfilled]  Patient has no known allergies  No current outpatient medications on file  Past Medical History:   Diagnosis Date    Known health problems: none     Patient denies medical problems 03/04/2020    Patient denies medical problems 03/23/2020     Past Surgical History:   Procedure Laterality Date    COLONOSCOPY      EGD      WISDOM TOOTH EXTRACTION       Family History   Problem Relation Age of Onset    No Known Problems Mother     Diabetes Father     No Known Problems Sister     Colorectal Cancer Neg Hx     Colon polyps Neg Hx       reports that he has never smoked   He has never used smokeless tobacco  He reports that he drinks about 2 0 standard drinks of alcohol per week  He reports that he has current or past drug history  Labs:   Lab Results   Component Value Date    WBC 8 43 07/16/2020    WBC 8 5 02/14/2020    HGB 15 3 07/16/2020    HGB 15 4 02/14/2020     07/16/2020     02/14/2020     Lab Results   Component Value Date    ALT 37 07/16/2020    ALT 30 03/18/2020    ALT 34 02/14/2020    AST 16 07/16/2020    AST 16 02/14/2020     This SmartLink has not been configured with any valid records  PHYSICAL EXAM  General Appearance:    Alert, cooperative, no distress   Head:    Normocephalic without obvious abnormality   Eyes:    PERRL, conjunctiva/corneas clear, EOM's intact        Neck:   Supple, no adenopathy, no JVD   Back:     Symmetric, no spinal or CVA tenderness   Lungs:     Clear to auscultation bilaterally, no wheezing or rhonchi   Heart:    Regular rate and rhythm, S1 and S2 normal, no murmur   Abdomen:     Soft +BS ND NT umbilical hernia reducible   Extremities:   Extremities normal  No clubbing, cyanosis or edema   Psych:   Normal Affect, AOx3  Neurologic:  Skin:   CNII-XII intact  Strength symmetric, speech intact    Warm, dry, intact, no visible rashes or lesions         Physical Exam              Some portions of this record may have been generated with voice recognition software  There may be translation, syntax,  or grammatical errors  Occasional wrong word or "sound-a-like" substitutions may have occurred due to the inherent limitations of the voice recognition software  Read the chart carefully and recognize, using context, where substitutions may have occurred  If you have any questions, please contact the dictating provider for clarification or correction, as needed  This encounter has been coded by a non-certified coder

## 2020-07-22 NOTE — H&P (VIEW-ONLY)
Assessment/Plan:   Karin Garcia is a 54 y o male who is here for Follow-up (F/U  CT done that showed abnormal appendix  Colonoscopy done by [facility] 7/9  WNL  To discuss possible appendectomy)    Plan: Appendiceal mucocele - colonoscopy normal, CT stable, discussed with patient that he would need continue survellience or consider appendectomy, patient wants surgery, discussed risks and benefits and operative approach and patietn understands and I will schedule for laparoscopic appendectomy    Umbilical hernia - will repair at time of hernia    Preoperative Clearance: None    HPI:  Karin Garcia is a 54 y o male who was referred for evaluation of Follow-up (F/U  CT done that showed abnormal appendix  Colonoscopy done by [facility] 7/9  WNL  To discuss possible appendectomy)    Currently no symptoms or changes since last visit  ROS:  General ROS: negative  negative for - chills, fatigue, fever or night sweats, weight loss  Respiratory ROS: no cough, shortness of breath, or wheezing  Cardiovascular ROS: no chest pain or dyspnea on exertion  Genito-Urinary ROS: no dysuria, trouble voiding, or hematuria  Musculoskeletal ROS: negative for - gait disturbance, joint pain or muscle pain  Neurological ROS: no TIA or stroke symptoms  GI: no sxs    [unfilled]  Patient has no known allergies  No current outpatient medications on file  Past Medical History:   Diagnosis Date    Known health problems: none     Patient denies medical problems 03/04/2020    Patient denies medical problems 03/23/2020     Past Surgical History:   Procedure Laterality Date    COLONOSCOPY      EGD      WISDOM TOOTH EXTRACTION       Family History   Problem Relation Age of Onset    No Known Problems Mother     Diabetes Father     No Known Problems Sister     Colorectal Cancer Neg Hx     Colon polyps Neg Hx       reports that he has never smoked   He has never used smokeless tobacco  He reports that he drinks about 2 0 standard drinks of alcohol per week  He reports that he has current or past drug history  Labs:   Lab Results   Component Value Date    WBC 8 43 07/16/2020    WBC 8 5 02/14/2020    HGB 15 3 07/16/2020    HGB 15 4 02/14/2020     07/16/2020     02/14/2020     Lab Results   Component Value Date    ALT 37 07/16/2020    ALT 30 03/18/2020    ALT 34 02/14/2020    AST 16 07/16/2020    AST 16 02/14/2020     This SmartLink has not been configured with any valid records  PHYSICAL EXAM  General Appearance:    Alert, cooperative, no distress   Head:    Normocephalic without obvious abnormality   Eyes:    PERRL, conjunctiva/corneas clear, EOM's intact        Neck:   Supple, no adenopathy, no JVD   Back:     Symmetric, no spinal or CVA tenderness   Lungs:     Clear to auscultation bilaterally, no wheezing or rhonchi   Heart:    Regular rate and rhythm, S1 and S2 normal, no murmur   Abdomen:     Soft +BS ND NT umbilical hernia reducible   Extremities:   Extremities normal  No clubbing, cyanosis or edema   Psych:   Normal Affect, AOx3  Neurologic:  Skin:   CNII-XII intact  Strength symmetric, speech intact    Warm, dry, intact, no visible rashes or lesions         Physical Exam              Some portions of this record may have been generated with voice recognition software  There may be translation, syntax,  or grammatical errors  Occasional wrong word or "sound-a-like" substitutions may have occurred due to the inherent limitations of the voice recognition software  Read the chart carefully and recognize, using context, where substitutions may have occurred  If you have any questions, please contact the dictating provider for clarification or correction, as needed  This encounter has been coded by a non-certified coder

## 2020-07-28 ENCOUNTER — HOSPITAL ENCOUNTER (OUTPATIENT)
Facility: HOSPITAL | Age: 45
Setting detail: OUTPATIENT SURGERY
Discharge: HOME | End: 2020-07-28
Attending: SURGERY | Admitting: SURGERY
Payer: COMMERCIAL

## 2020-07-28 ENCOUNTER — ANESTHESIA (OUTPATIENT)
Dept: PERIOP | Facility: HOSPITAL | Age: 45
End: 2020-07-28
Payer: COMMERCIAL

## 2020-07-28 ENCOUNTER — ANESTHESIA EVENT (OUTPATIENT)
Dept: PERIOP | Facility: HOSPITAL | Age: 45
End: 2020-07-28
Payer: COMMERCIAL

## 2020-07-28 VITALS
HEART RATE: 44 BPM | WEIGHT: 252.25 LBS | RESPIRATION RATE: 18 BRPM | SYSTOLIC BLOOD PRESSURE: 119 MMHG | OXYGEN SATURATION: 100 % | BODY MASS INDEX: 34.17 KG/M2 | HEIGHT: 72 IN | TEMPERATURE: 97.8 F | DIASTOLIC BLOOD PRESSURE: 62 MMHG

## 2020-07-28 DIAGNOSIS — Z90.49 S/P APPENDECTOMY: Primary | ICD-10-CM

## 2020-07-28 DIAGNOSIS — D37.3 APPENDICEAL TUMOR: ICD-10-CM

## 2020-07-28 DIAGNOSIS — K42.9 UMBILICAL HERNIA WITHOUT OBSTRUCTION OR GANGRENE: ICD-10-CM

## 2020-07-28 PROCEDURE — 88302 TISSUE EXAM BY PATHOLOGIST: CPT | Performed by: PATHOLOGY

## 2020-07-28 PROCEDURE — 44970 LAPAROSCOPY APPENDECTOMY: CPT | Performed by: PHYSICIAN ASSISTANT

## 2020-07-28 PROCEDURE — NC001 PR NO CHARGE: Performed by: SURGERY

## 2020-07-28 RX ORDER — ONDANSETRON 2 MG/ML
4 INJECTION INTRAMUSCULAR; INTRAVENOUS ONCE
Status: DISCONTINUED | OUTPATIENT
Start: 2020-07-28 | End: 2020-07-28 | Stop reason: HOSPADM

## 2020-07-28 RX ORDER — DEXAMETHASONE SODIUM PHOSPHATE 4 MG/ML
INJECTION, SOLUTION INTRA-ARTICULAR; INTRALESIONAL; INTRAMUSCULAR; INTRAVENOUS; SOFT TISSUE AS NEEDED
Status: DISCONTINUED | OUTPATIENT
Start: 2020-07-28 | End: 2020-07-28 | Stop reason: SURG

## 2020-07-28 RX ORDER — ONDANSETRON 2 MG/ML
INJECTION INTRAMUSCULAR; INTRAVENOUS AS NEEDED
Status: DISCONTINUED | OUTPATIENT
Start: 2020-07-28 | End: 2020-07-28 | Stop reason: SURG

## 2020-07-28 RX ORDER — NEOSTIGMINE METHYLSULFATE 1 MG/ML
INJECTION INTRAVENOUS AS NEEDED
Status: DISCONTINUED | OUTPATIENT
Start: 2020-07-28 | End: 2020-07-28 | Stop reason: SURG

## 2020-07-28 RX ORDER — ROCURONIUM BROMIDE 10 MG/ML
INJECTION, SOLUTION INTRAVENOUS AS NEEDED
Status: DISCONTINUED | OUTPATIENT
Start: 2020-07-28 | End: 2020-07-28 | Stop reason: SURG

## 2020-07-28 RX ORDER — FENTANYL CITRATE 50 UG/ML
INJECTION, SOLUTION INTRAMUSCULAR; INTRAVENOUS AS NEEDED
Status: DISCONTINUED | OUTPATIENT
Start: 2020-07-28 | End: 2020-07-28 | Stop reason: SURG

## 2020-07-28 RX ORDER — ONDANSETRON 2 MG/ML
4 INJECTION INTRAMUSCULAR; INTRAVENOUS EVERY 6 HOURS PRN
Status: DISCONTINUED | OUTPATIENT
Start: 2020-07-28 | End: 2020-07-28 | Stop reason: HOSPADM

## 2020-07-28 RX ORDER — HYDROCODONE BITARTRATE AND ACETAMINOPHEN 5; 325 MG/1; MG/1
1 TABLET ORAL EVERY 4 HOURS PRN
Qty: 15 TABLET | Refills: 0 | Status: SHIPPED | OUTPATIENT
Start: 2020-07-28 | End: 2020-07-31

## 2020-07-28 RX ORDER — PROPOFOL 10 MG/ML
INJECTION, EMULSION INTRAVENOUS AS NEEDED
Status: DISCONTINUED | OUTPATIENT
Start: 2020-07-28 | End: 2020-07-28 | Stop reason: SURG

## 2020-07-28 RX ORDER — CEFAZOLIN SODIUM 2 G/50ML
2000 SOLUTION INTRAVENOUS ONCE
Status: COMPLETED | OUTPATIENT
Start: 2020-07-28 | End: 2020-07-28

## 2020-07-28 RX ORDER — METOCLOPRAMIDE HYDROCHLORIDE 5 MG/ML
10 INJECTION INTRAMUSCULAR; INTRAVENOUS ONCE
Status: DISCONTINUED | OUTPATIENT
Start: 2020-07-28 | End: 2020-07-28 | Stop reason: HOSPADM

## 2020-07-28 RX ORDER — KETOROLAC TROMETHAMINE 30 MG/ML
INJECTION, SOLUTION INTRAMUSCULAR; INTRAVENOUS AS NEEDED
Status: DISCONTINUED | OUTPATIENT
Start: 2020-07-28 | End: 2020-07-28 | Stop reason: SURG

## 2020-07-28 RX ORDER — FENTANYL CITRATE/PF 50 MCG/ML
25 SYRINGE (ML) INJECTION
Status: DISCONTINUED | OUTPATIENT
Start: 2020-07-28 | End: 2020-07-28 | Stop reason: HOSPADM

## 2020-07-28 RX ORDER — GLYCOPYRROLATE 0.2 MG/ML
INJECTION INTRAMUSCULAR; INTRAVENOUS AS NEEDED
Status: DISCONTINUED | OUTPATIENT
Start: 2020-07-28 | End: 2020-07-28 | Stop reason: SURG

## 2020-07-28 RX ORDER — HYDROCODONE BITARTRATE AND ACETAMINOPHEN 5; 325 MG/1; MG/1
2 TABLET ORAL EVERY 4 HOURS PRN
Status: DISCONTINUED | OUTPATIENT
Start: 2020-07-28 | End: 2020-07-28 | Stop reason: HOSPADM

## 2020-07-28 RX ORDER — MAGNESIUM HYDROXIDE 1200 MG/15ML
LIQUID ORAL AS NEEDED
Status: DISCONTINUED | OUTPATIENT
Start: 2020-07-28 | End: 2020-07-28 | Stop reason: HOSPADM

## 2020-07-28 RX ORDER — LIDOCAINE HYDROCHLORIDE 10 MG/ML
INJECTION, SOLUTION EPIDURAL; INFILTRATION; INTRACAUDAL; PERINEURAL AS NEEDED
Status: DISCONTINUED | OUTPATIENT
Start: 2020-07-28 | End: 2020-07-28 | Stop reason: SURG

## 2020-07-28 RX ORDER — SODIUM CHLORIDE, SODIUM LACTATE, POTASSIUM CHLORIDE, CALCIUM CHLORIDE 600; 310; 30; 20 MG/100ML; MG/100ML; MG/100ML; MG/100ML
125 INJECTION, SOLUTION INTRAVENOUS CONTINUOUS
Status: DISCONTINUED | OUTPATIENT
Start: 2020-07-28 | End: 2020-07-28 | Stop reason: HOSPADM

## 2020-07-28 RX ORDER — MIDAZOLAM HYDROCHLORIDE 2 MG/2ML
INJECTION, SOLUTION INTRAMUSCULAR; INTRAVENOUS AS NEEDED
Status: DISCONTINUED | OUTPATIENT
Start: 2020-07-28 | End: 2020-07-28 | Stop reason: SURG

## 2020-07-28 RX ORDER — IBUPROFEN 600 MG/1
600 TABLET ORAL EVERY 6 HOURS PRN
Qty: 15 TABLET | Refills: 0 | Status: SHIPPED | OUTPATIENT
Start: 2020-07-28 | End: 2021-02-10 | Stop reason: ALTCHOICE

## 2020-07-28 RX ORDER — HYDROMORPHONE HCL/PF 1 MG/ML
0.5 SYRINGE (ML) INJECTION
Status: DISCONTINUED | OUTPATIENT
Start: 2020-07-28 | End: 2020-07-28 | Stop reason: HOSPADM

## 2020-07-28 RX ORDER — ACETAMINOPHEN 325 MG/1
650 TABLET ORAL EVERY 6 HOURS PRN
Status: DISCONTINUED | OUTPATIENT
Start: 2020-07-28 | End: 2020-07-28 | Stop reason: HOSPADM

## 2020-07-28 RX ADMIN — KETOROLAC TROMETHAMINE 15 MG: 30 INJECTION, SOLUTION INTRAMUSCULAR; INTRAVENOUS at 08:51

## 2020-07-28 RX ADMIN — HYDROCODONE BITARTRATE AND ACETAMINOPHEN 2 TABLET: 5; 325 TABLET ORAL at 10:27

## 2020-07-28 RX ADMIN — FENTANYL CITRATE 50 MCG: 50 INJECTION, SOLUTION INTRAMUSCULAR; INTRAVENOUS at 08:27

## 2020-07-28 RX ADMIN — LIDOCAINE HYDROCHLORIDE 50 MG: 10 INJECTION, SOLUTION EPIDURAL; INFILTRATION; INTRACAUDAL; PERINEURAL at 08:30

## 2020-07-28 RX ADMIN — PROPOFOL 200 MG: 10 INJECTION, EMULSION INTRAVENOUS at 08:30

## 2020-07-28 RX ADMIN — NEOSTIGMINE METHYLSULFATE 4 MG: 1 INJECTION, SOLUTION INTRAVENOUS at 09:03

## 2020-07-28 RX ADMIN — GLYCOPYRROLATE 0.6 MG: 0.2 INJECTION, SOLUTION INTRAMUSCULAR; INTRAVENOUS at 09:03

## 2020-07-28 RX ADMIN — GLYCOPYRROLATE 0.4 MG: 0.2 INJECTION, SOLUTION INTRAMUSCULAR; INTRAVENOUS at 08:46

## 2020-07-28 RX ADMIN — METRONIDAZOLE 500 MG: 500 INJECTION, SOLUTION INTRAVENOUS at 08:15

## 2020-07-28 RX ADMIN — DEXAMETHASONE SODIUM PHOSPHATE 4 MG: 4 INJECTION, SOLUTION INTRAMUSCULAR; INTRAVENOUS at 08:40

## 2020-07-28 RX ADMIN — SODIUM CHLORIDE, SODIUM LACTATE, POTASSIUM CHLORIDE, AND CALCIUM CHLORIDE 125 ML/HR: .6; .31; .03; .02 INJECTION, SOLUTION INTRAVENOUS at 06:54

## 2020-07-28 RX ADMIN — ONDANSETRON 4 MG: 2 INJECTION INTRAMUSCULAR; INTRAVENOUS at 08:47

## 2020-07-28 RX ADMIN — CEFAZOLIN SODIUM 2000 MG: 2 SOLUTION INTRAVENOUS at 08:13

## 2020-07-28 RX ADMIN — FENTANYL CITRATE 50 MCG: 50 INJECTION, SOLUTION INTRAMUSCULAR; INTRAVENOUS at 08:38

## 2020-07-28 RX ADMIN — ROCURONIUM BROMIDE 40 MG: 10 INJECTION, SOLUTION INTRAVENOUS at 08:31

## 2020-07-28 RX ADMIN — FENTANYL CITRATE 50 MCG: 50 INJECTION, SOLUTION INTRAMUSCULAR; INTRAVENOUS at 08:20

## 2020-07-28 RX ADMIN — FENTANYL CITRATE 50 MCG: 50 INJECTION, SOLUTION INTRAMUSCULAR; INTRAVENOUS at 09:10

## 2020-07-28 RX ADMIN — MIDAZOLAM 2 MG: 1 INJECTION INTRAMUSCULAR; INTRAVENOUS at 08:10

## 2020-07-28 NOTE — DISCHARGE INSTRUCTIONS
Araceli Vincent Instructions                  Dr Ralph ALMAGUER     1  General: Romi Weiss will feel pulling sensations around the wound or funny aches and pains around the incisions  This is normal  Even minor surgery is a change in your body and this is your bodys way of reaction to it  If you have had abdominal surgery, it may help to support the incision with a small pillow or blanket for comfort when moving or coughing  2  Wound care:       Glue - Leave glue alone, it will fall off on its own, no need for an additional dressings    3  Water: You may shower over the wounds  Do not bathe or use a pool or hot tub until cleared by the physician  You may shower right over the incisions and rinse wound with soapy water but do not scrub incisions  Pat dry when you are done  4  Activity: You may go up and down stairs, walk as much as you are comfortable, but walk at least 3 times each day  If you have had abdominal surgery, do not lift anything heavier than 15 pounds for at least 2 weeks, unless cleared by the doctor  5  Diet: You may resume a regular diet  If you had a same-day surgery or overnight stay surgery, you may wish to eat lightly for a few days: soups, crackers, and sandwiches  You may resume a regular diet when ready  6  Medications: Resume all of your previous medications, unless told otherwise by the doctor  Avoid aspirin products for 2-3 days after the date of surgery  You may, at that time, began to take them again  Tylenol and Ibuprofen are always fine, unless you are taking any narcotic pain medication containing Tylenol (such as Percocet, Darvocet, Vicodin, or anything containing acetaminophen)  Do not take Tylenol if you're taking these medications  You do not need to take the narcotic pain medications unless you are having significant pain and discomfort  7  Driving: You will need someone to drive you home on the day of surgery   Do not drive or make any important decisions while on narcotic pain medication or 24 hours and after anesthesia or sedation for surgery  Generally, you may drive when your off all narcotic pain medications  8  Upset Stomach: You may take Maalox, Tums, or similar items for an upset stomach  If your narcotic pain medication causes an upset stomach, do not take it on an empty stomach  Try taking it with at least some crackers or toast      9  Constipation: Patients often experienced constipation after surgery  You may take over-the-counter medication for this, such as Metamucil, Senokot, Dulcolax, milk of magnesia, etc  You may take a suppository unless you have had anorectal surgery such as a procedure on your hemorrhoids  If you experience significant nausea or vomiting after abdominal surgery, call the office before trying any of these medications  10  Call the office: If you are experiencing any of the following, fevers above 101 5°, significant nausea or vomiting, if the wound develops drainage and/or is excessive redness around the wound, or if you have significant diarrhea or other worsening symptoms  11  Pain: You may be given a prescription for pain  This will be given to the hospital, the day of surgery  12  Sexual Activity: You may resume sexual activity when you feel ready and comfortable and your incision is sealed and healed without apparent infection risk  13  Urination: If you haven't urinated in 6 hours, go directly to the ER for evaluation for urinary retention  14  Follow-up in 2 weeks          [facility]  Phone: [phone]

## 2020-07-28 NOTE — INTERIM OP NOTE
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]  Umbilical hernia without obstruction or gangrene [K42 9]    Post-Op Diagnosis Codes:     * Appendiceal tumor [Q22 9]     * Umbilical hernia without obstruction or gangrene [K42 9]    Procedure(s) (LRB):  APPENDECTOMY LAPAROSCOPIC (N/A)  REPAIR HERNIA UMBILICAL (N/A)    Surgeon(s) and Role:     * Bruce Shaw MD - Primary     * Felicia Clarke PA-C - Assisting    Specimens:  ID Type Source Tests Collected by Time Destination   1 :  Tissue Appendix TISSUE EXAM Bruce Shaw MD 2020 [id number]        Estimated Blood Loss:   Minimal    Anesthesia Type:   General  ETA    Findings:   As above  Appendix dilatation from the mid to distal appendix, tip normal, no definitive masses  Small umbilical hernia  Complications:   None    Hernia Surgery Operative Note    Name: Saniya Langston    Gender: male    Age: 54 y o  Race:     BMI: Body mass index is 34 21 kg/m²  DIAGNOSIS:   1  S/P appendectomy  HYDROcodone-acetaminophen (NORCO) 5-325 mg per tablet    ibuprofen (MOTRIN) 600 mg tablet   2  Appendiceal tumor  ceFAZolin (ANCEF) IVPB (premix) 2,000 mg 50 mL    metroNIDAZOLE (FLAGYL) IVPB (premix) 500 mg 100 mL    lactated ringers infusion    Reason for no Mechanical VTE Prophylaxis    Reason for no Mechanical VTE Prophylaxis    Tissue Exam    Tissue Exam   3   Umbilical hernia without obstruction or gangrene  ceFAZolin (ANCEF) IVPB (premix) 2,000 mg 50 mL    metroNIDAZOLE (FLAGYL) IVPB (premix) 500 mg 100 mL    lactated ringers infusion    Reason for no Mechanical VTE Prophylaxis    Reason for no Mechanical VTE Prophylaxis    Tissue Exam    Tissue Exam       Diabetes Mellitis: No    Coronary Heart Disease: No    Cancer: No    Steroid Use: No    Tobacco use: No   Last used: never smoked     Alcohol use: Yes Moderate    Location of Hernia: umbilical  YRJSCI:2 8 cm  Width:1 0  Cm  Primary: Yes  Recurrent: No   Number of recurrences:    Access: Open    Component Separation: No    Mesh:   No    Operative Time: 21 min             SIGNATURE: Shimon Clarke PA-C   DATE: July 28, 2020   TIME: 9:02 AM

## 2020-07-28 NOTE — INTERVAL H&P NOTE
H&P reviewed  After examining the patient I find no changes in the patients condition since the H&P had been written      Vitals:    07/28/20 0645   BP: 145/84   Pulse: 56   Resp: 16   Temp: 97 9 °F (36 6 °C)   SpO2: 98%

## 2020-07-28 NOTE — ANESTHESIA PREPROCEDURE EVALUATION
Review of Systems/Medical History  Patient summary reviewed  Chart reviewed      Cardiovascular  EKG reviewed, Negative cardio ROS    Pulmonary  Negative pulmonary ROS   Comment: ? JOSE MANUEL     GI/Hepatic  Negative GI/hepatic ROS   Liver disease ,   Comment: Fatty liver  hepatomegaly     Negative  ROS        Endo/Other  Negative endo/other ROS      GYN  Negative gynecology ROS          Hematology  Negative hematology ROS      Musculoskeletal  Negative musculoskeletal ROS        Neurology  Negative neurology ROS      Psychology   Negative psychology ROS              Physical Exam    Airway    Mallampati score: II  TM Distance: >3 FB  Neck ROM: full     Dental   No notable dental hx     Cardiovascular  Comment: Negative ROS, Rhythm: regular, Rate: normal, Cardiovascular exam normal    Pulmonary  Pulmonary exam normal Breath sounds clear to auscultation,     Other Findings        Anesthesia Plan  ASA Score- 2     Anesthesia Type- general with ASA Monitors  Additional Monitors:   Airway Plan: ETT  Plan Factors-    Induction- intravenous  Postoperative Plan-     Informed Consent- Anesthetic plan and risks discussed with patient and spouse  I personally reviewed this patient with the CRNA  Discussed and agreed on the Anesthesia Plan with the CRNA  Lester Burgess

## 2020-07-31 ENCOUNTER — TELEPHONE (OUTPATIENT)
Dept: SURGERY | Facility: HOSPITAL | Age: 45
End: 2020-07-31

## 2020-07-31 NOTE — OP NOTE
Appendectomy, Lap, Procedure Note    Name: Taylor Morales   : 1975  MRN: [id number]  Date: 2020    Indications: The patient presented with a history of dilated appendix consistent with mucocele    Pre-operative Diagnosis: Mucocele of appendix and umbilical hernia    Post-operative Diagnosis: Same    Procedure: Laparoscopic Appendectomy and umbilical hernia repair    Surgeon: Leighann Benites MD    Assistants: Abdi Clarke PA-C    Anesthesia: General endotracheal anesthesia         Note: No qualified resident available  Physician Assistant medically necessary for surgical safety of case and for suturing, retraction, and hemostasis  Procedure Details: The patient was seen again in the Holding Room  The risks, benefits, complications, treatment options, and expected outcomes were discussed with the patient and/or family  The possibilities of reaction to medication, pulmonary aspiration, perforation of viscus, bleeding, recurrent infection, finding a normal appendix, the need for additional procedures, failure to diagnose a condition, and creating a complication requiring transfusion or operation were discussed  There was concurrence with the proposed plan and informed consent was obtained  The site of surgery was properly noted/marked  The patient was taken to Operating Room, identified as Taylor Morales and the procedure verified as Appendectomy  A Time Out was held after the prep and draping,  and the above information confirmed  The patient was placed in the supine position and general anesthesia was induced, along with placement of orogastric tube, Venodyne boots, and a Woodson catheter  The abdomen was prepped and draped in a sterile fashion  An infraumbilical incision was made and a Morrie Pacific was passed around the umbilical stalk using cautery  The hernia sac was excised  A Milly port was placed via the umbilical defect and a pneumoperitoneum created    Additional ports were placed under direct vision in the routine positions  A careful evaluation of the entire abdomen was carried out  The patient was placed in Trendelenburg and left lateral decubitus position  The small intestines were retracted in the cephalad and left lateral direction away from the pelvis and right lower quadrant  The appendix was carefully dissected  A window was made in the mesoappendix at the base of the appendix using a [place]  A endo-JOSSELINE stapler with a blue load was fire at the base of the appendix at the level of the cecum  A harmonic scalpel was then fired across the mesoappendix  There was no evidence of bleeding, leakage, or complication after division of the appendix and mesoappendix  Irrigation was also performed and irrigate suctioned from the abdomen as well  The umbilical hernia fascia was closed using figure of eight prolene sutures  All skin incisions were closed using MonocryI suture in a subcuticular fashion  The instrument, sponge, and needle counts were correct at the conclusion of the case  Findings:  Mucocele of appendix and umbilical hernia    Estimated Blood Loss:  Minimal                          Specimens: Appendix  Order Name Source Comment Collection Info Order Time   TISSUE EXAM Appendix  Collected By: Michael Kim MD 7/28/2020  8:51 AM              Complications:  None; patient tolerated the procedure well  Disposition: PACU            Condition: Stable    Attending Attestation: I was present for the entire procedure and qualified resident physician was not available      Signature:   Michael Kim MD  Date: 7/31/2020 Time: 11:40 AM

## 2020-08-03 NOTE — PROGRESS NOTES
Assessment/Plan:   Saniya Langston is a 54 y o male who is here for Abdominal Pain (New patient consult for chronic RUQ pain for aproximately 2 years  US, HIDA SCAN and CT'S x2 done)    Plan: Appendiceal mucocele - would recommend colonoscopy to rule out cecal or appendiceal mass, depending on findings will discuss operative vs conservative mgt    Preoperative Clearance: None    HPI:  Saniya Langston is a 54 y o male who was referred for evaluation of Abdominal Pain (New patient consult for chronic RUQ pain for aproximately 2 years  US, HIDA SCAN and CT'S x2 done)    Currently having right sided pain occasionally, no change in pain with eating or activity  ROS:  General ROS: negative  negative for - chills, fatigue, fever or night sweats, weight loss  Respiratory ROS: no cough, shortness of breath, or wheezing  Cardiovascular ROS: no chest pain or dyspnea on exertion  Genito-Urinary ROS: no dysuria, trouble voiding, or hematuria  Musculoskeletal ROS: negative for - gait disturbance, joint pain or muscle pain  Neurological ROS: no TIA or stroke symptoms  abdominal pain    [unfilled]  Patient has no known allergies      Current Outpatient Medications:     ibuprofen (MOTRIN) 600 mg tablet, Take 1 tablet (600 mg total) by mouth every 6 (six) hours as needed for mild pain or moderate pain, Disp: 15 tablet, Rfl: 0  Past Medical History:   Diagnosis Date    Known health problems: none     Patient denies medical problems 03/04/2020    Patient denies medical problems 03/23/2020     Past Surgical History:   Procedure Laterality Date    COLONOSCOPY      EGD      [place] LAP,APPENDECTOMY N/A 7/28/2020    Procedure: APPENDECTOMY LAPAROSCOPIC;  Surgeon: Bruce Shaw MD;  Location:  MAIN OR;  Service: [address] [place] [address],7+N/F,[id number] N/A 7/28/2020    Procedure: REPAIR HERNIA UMBILICAL;  Surgeon: Bruce Shaw MD;  Location:  MAIN OR;  Service: General    WISDOM TOOTH EXTRACTION       Family History   Problem Relation Age of Onset    No Known Problems Mother     Diabetes Father     No Known Problems Sister     Colorectal Cancer Neg Hx     Colon polyps Neg Hx       reports that he has never smoked  He has never used smokeless tobacco  He reports current alcohol use of about 2 0 standard drinks of alcohol per week  He reports previous drug use  Labs:   Lab Results   Component Value Date    WBC 8 43 07/16/2020    WBC 8 5 02/14/2020    HGB 15 3 07/16/2020    HGB 15 4 02/14/2020     07/16/2020     02/14/2020     Lab Results   Component Value Date    ALT 37 07/16/2020    ALT 30 03/18/2020    ALT 34 02/14/2020    AST 16 07/16/2020    AST 16 02/14/2020     This SmartLink has not been configured with any valid records  PHYSICAL EXAM  General Appearance:    Alert, cooperative, no distress,   Head:    Normocephalic without obvious abnormality   Eyes:    PERRL, conjunctiva/corneas clear, EOM's intact        Neck:   Supple, no adenopathy, no JVD   Back:     Symmetric, no spinal or CVA tenderness   Lungs:     Clear to auscultation bilaterally, no wheezing or rhonchi   Heart:    Regular rate and rhythm, S1 and S2 normal, no murmur   Abdomen:     Soft +BS ND NT   Extremities:   Extremities normal  No clubbing, cyanosis or edema   Psych:   Normal Affect, AOx3  Neurologic:  Skin:   CNII-XII intact  Strength symmetric, speech intact    Warm, dry, intact, no visible rashes or lesions         Physical Exam              Some portions of this record may have been generated with voice recognition software  There may be translation, syntax,  or grammatical errors  Occasional wrong word or "sound-a-like" substitutions may have occurred due to the inherent limitations of the voice recognition software  Read the chart carefully and recognize, using context, where substitutions may have occurred   If you have any questions, please contact the dictating provider for clarification or correction, as needed  This encounter has been coded by a non-certified coder

## 2020-08-10 ENCOUNTER — OFFICE VISIT (OUTPATIENT)
Dept: SURGERY | Facility: HOSPITAL | Age: 45
End: 2020-08-10

## 2020-08-10 VITALS — HEIGHT: 72 IN | TEMPERATURE: 98.1 F | WEIGHT: 250 LBS | BODY MASS INDEX: 33.86 KG/M2

## 2020-08-10 DIAGNOSIS — Z09 POSTOP CHECK: Primary | ICD-10-CM

## 2020-08-10 PROCEDURE — 99024 POSTOP FOLLOW-UP VISIT: CPT | Performed by: SURGERY

## 2020-08-10 PROCEDURE — 3008F BODY MASS INDEX DOCD: CPT | Performed by: SURGERY

## 2020-08-10 NOTE — PROGRESS NOTES
Seen and examined no acute event doing well  AVSS afebrile  Soft +BS ND incision cdi    S/p lap appy, UHR    Cont light duty for two weeks, path benign, f/u prn

## 2021-02-12 ENCOUNTER — OFFICE VISIT (OUTPATIENT)
Dept: FAMILY MEDICINE CLINIC | Facility: CLINIC | Age: 46
End: 2021-02-12
Payer: COMMERCIAL

## 2021-02-12 VITALS
OXYGEN SATURATION: 97 % | TEMPERATURE: 98.5 F | SYSTOLIC BLOOD PRESSURE: 130 MMHG | DIASTOLIC BLOOD PRESSURE: 84 MMHG | HEIGHT: 73 IN | WEIGHT: 262.8 LBS | RESPIRATION RATE: 20 BRPM | HEART RATE: 62 BPM | BODY MASS INDEX: 34.83 KG/M2

## 2021-02-12 DIAGNOSIS — R94.6 ABNORMAL FINDING ON THYROID FUNCTION TEST: ICD-10-CM

## 2021-02-12 DIAGNOSIS — Z12.5 SCREENING FOR PROSTATE CANCER: ICD-10-CM

## 2021-02-12 DIAGNOSIS — Z13.6 SCREENING FOR CARDIOVASCULAR CONDITION: ICD-10-CM

## 2021-02-12 DIAGNOSIS — Z00.00 ENCOUNTER FOR WELL ADULT EXAM WITHOUT ABNORMAL FINDINGS: Primary | ICD-10-CM

## 2021-02-12 DIAGNOSIS — A69.20 LYME DISEASE: ICD-10-CM

## 2021-02-12 PROCEDURE — 3008F BODY MASS INDEX DOCD: CPT | Performed by: FAMILY MEDICINE

## 2021-02-12 PROCEDURE — 99396 PREV VISIT EST AGE 40-64: CPT | Performed by: FAMILY MEDICINE

## 2021-02-12 PROCEDURE — 1036F TOBACCO NON-USER: CPT | Performed by: FAMILY MEDICINE

## 2021-02-12 PROCEDURE — 3725F SCREEN DEPRESSION PERFORMED: CPT | Performed by: FAMILY MEDICINE

## 2021-02-12 NOTE — PATIENT INSTRUCTIONS
Obesity   AMBULATORY CARE:   Obesity  is when your body mass index (BMI) is greater than 30  Your healthcare provider will use your height and weight to measure your BMI  The risks of obesity include  many health problems, such as injuries or physical disability  You may need tests to check for the following:  · Diabetes    · High blood pressure or high cholesterol    · Heart disease    · Gallbladder or liver disease    · Cancer of the colon, breast, prostate, liver, or kidney    · Sleep apnea    · Arthritis or gout    Seek care immediately if:   · You have a severe headache, confusion, or difficulty speaking  · You have weakness on one side of your body  · You have chest pain, sweating, or shortness of breath  Contact your healthcare provider if:   · You have symptoms of gallbladder or liver disease, such as pain in your upper abdomen  · You have knee or hip pain and discomfort while walking  · You have symptoms of diabetes, such as intense hunger and thirst, and frequent urination  · You have symptoms of sleep apnea, such as snoring or daytime sleepiness  · You have questions or concerns about your condition or care  Treatment for obesity  focuses on helping you lose weight to improve your health  Even a small decrease in BMI can reduce the risk for many health problems  Your healthcare provider will help you set a weight-loss goal   · Lifestyle changes  are the first step in treating obesity  These include making healthy food choices and getting regular physical activity  Your healthcare provider may suggest a weight-loss program that involves coaching, education, and therapy  · Medicine  may help you lose weight when it is used with a healthy diet and physical activity  · Surgery  can help you lose weight if you are very obese and have other health problems  There are several types of weight-loss surgery  Ask your healthcare provider for more information      Be successful losing weight:   · Set small, realistic goals  An example of a small goal is to walk for 20 minutes 5 days a week  Anther goal is to lose 5% of your body weight  · Tell friends, family members, and coworkers about your goals  and ask for their support  Ask a friend to lose weight with you, or join a weight-loss support group  · Identify foods or triggers that may cause you to overeat , and find ways to avoid them  Remove tempting high-calorie foods from your home and workplace  Place a bowl of fresh fruit on your kitchen counter  If stress causes you to eat, then find other ways to cope with stress  · Keep a diary to track what you eat and drink  Also write down how many minutes of physical activity you do each day  Weigh yourself once a week and record it in your diary  Eating changes: You will need to eat 500 to 1,000 fewer calories each day than you currently eat to lose 1 to 2 pounds a week  The following changes will help you cut calories:  · Eat smaller portions  Use small plates, no larger than 9 inches in diameter  Fill your plate half full of fruits and vegetables  Measure your food using measuring cups until you know what a serving size looks like  · Eat 3 meals and 1 or 2 snacks each day  Plan your meals in advance  [place] and eat at home most of the time  Eat slowly  Do not skip meals  Skipping meals can lead to overeating later in the day  This can make it harder for you to lose weight  Talk with a dietitian to help you make a meal plan and schedule that is right for you  · Eat fruits and vegetables at every meal   They are low in calories and high in fiber, which makes you feel full  Do not add butter, margarine, or cream sauce to vegetables  Use herbs to season steamed vegetables  · Eat less fat and fewer fried foods  Eat more baked or grilled chicken and fish  These protein sources are lower in calories and fat than red meat  Limit fast food   Dress your salads with olive oil and vinegar instead of bottled dressing  · Limit the amount of sugar you eat  Do not drink sugary beverages  Limit alcohol  Activity changes:  Physical activity is good for your body in many ways  It helps you burn calories and build strong muscles  It decreases stress and depression, and improves your mood  It can also help you sleep better  Talk to your healthcare provider before you begin an exercise program   · Exercise for at least 30 minutes 5 days a week  Start slowly  Set aside time each day for physical activity that you enjoy and that is convenient for you  It is best to do both weight training and an activity that increases your heart rate, such as walking, bicycling, or swimming  · Find ways to be more active  Do yard work and housecleaning  Walk up the stairs instead of using elevators  Spend your leisure time going to events that require walking, such as outdoor festivals or fairs  This extra physical activity can help you lose weight and keep it off  Follow up with your healthcare provider as directed: You may need to meet with a dietitian  Write down your questions so you remember to ask them during your visits  © Copyright [address] [place] [address] Drive Information is for End User's use only and may not be sold, redistributed or otherwise used for commercial purposes  All illustrations and images included in CareNotes® are the copyrighted property of A D A M , Inc  or [facility] [place] Diez   The above information is an  only  It is not intended as medical advice for individual conditions or treatments  Talk to your doctor, nurse or pharmacist before following any medical regimen to see if it is safe and effective for you  Weight Management   AMBULATORY CARE:   Why it is important to manage your weight:  Being overweight increases your risk of health conditions such as heart disease, high blood pressure, type 2 diabetes, and certain types of cancer   It can also increase your risk for osteoarthritis, sleep apnea, and other respiratory problems  Aim for a slow, steady weight loss  Even a small amount of weight loss can lower your risk of health problems  How to lose weight safely:  A safe and healthy way to lose weight is to eat fewer calories and get regular exercise  · You can lose up about 1 pound a week by decreasing the number of calories you eat by 500 calories each day  You can decrease calories by eating smaller portion sizes or by cutting out high-calorie foods  Read labels to find out how many calories are in the foods you eat  · You can also burn calories with exercise such as walking, swimming, or biking  You will be more likely to keep weight off if you make these changes part of your lifestyle  Exercise at least 30 minutes per day on most days of the week  You can also fit in more physical activity by taking the stairs instead of the elevator or parking farther away from stores  Ask your healthcare provider about the best exercise plan for you  Healthy meal plan for weight management:  A healthy meal plan includes a variety of foods, contains fewer calories, and helps you stay healthy  A healthy meal plan includes the following:     · Eat whole-grain foods more often  A healthy meal plan should contain fiber  Fiber is the part of grains, fruits, and vegetables that is not broken down by your body  Whole-grain foods are healthy and provide extra fiber in your diet  Some examples of whole-grain foods are whole-wheat breads and pastas, oatmeal, brown rice, and bulgur  · Eat a variety of vegetables every day  Include dark, leafy greens such as spinach, kale, julieth greens, and mustard greens  Eat yellow and orange vegetables such as carrots, sweet potatoes, and winter squash  · Eat a variety of fruits every day  Choose fresh or canned fruit (canned in its own juice or light syrup) instead of juice  Fruit juice has very little or no fiber      · Eat low-fat dairy foods  Drink fat-free (skim) milk or 1% milk  Eat fat-free yogurt and low-fat cottage cheese  Try low-fat cheeses such as mozzarella and other reduced-fat cheeses  · Choose meat and other protein foods that are low in fat  Choose beans or other legumes such as split peas or lentils  Choose fish, skinless poultry (chicken or turkey), or lean cuts of red meat (beef or pork)  Before you cook meat or poultry, cut off any visible fat  · Use less fat and oil  Try baking foods instead of frying them  Add less fat, such as margarine, sour cream, regular salad dressing and mayonnaise to foods  Eat fewer high-fat foods  Some examples of high-fat foods include french fries, doughnuts, ice cream, and cakes  · Eat fewer sweets  Limit foods and drinks that are high in sugar  This includes candy, cookies, regular soda, and sweetened drinks  Ways to decrease calories:   · Eat smaller portions  ? Use a small plate with smaller servings  ? Do not eat second helpings  ? When you eat at a restaurant, ask for a box and place half of your meal in the box before you eat  ? Share an entrée with someone else  · Replace high-calorie snacks with healthy, low-calorie snacks  ? Choose fresh fruit, vegetables, fat-free rice cakes, or air-popped popcorn instead of potato chips, nuts, or chocolate  ? Choose water or calorie-free drinks instead of soda or sweetened drinks  · Do not shop for groceries when you are hungry  You may be more likely to make unhealthy food choices  Take a grocery list of healthy foods and shop after you have eaten  · Eat regular meals  Do not skip meals  Skipping meals can lead to overeating later in the day  This can make it harder for you to lose weight  Eat a healthy snack in place of a meal if you do not have time to eat a regular meal  Talk with a dietitian to help you create a meal plan and schedule that is right for you      Other things to consider as you try to lose weight:   · Be aware of situations that may give you the urge to overeat, such as eating while watching television  Find ways to avoid these situations  For example, read a book, go for a walk, or do crafts  · Meet with a weight loss support group or friends who are also trying to lose weight  This may help you stay motivated to continue working on your weight loss goals  © Copyright [address] Information is for End User's use only and may not be sold, redistributed or otherwise used for commercial purposes  All illustrations and images included in CareNotes® are the copyrighted property of [employer]  or [facility] [place] Diez   The above information is an  only  It is not intended as medical advice for individual conditions or treatments  Talk to your doctor, nurse or pharmacist before following any medical regimen to see if it is safe and effective for you

## 2021-02-12 NOTE — PROGRESS NOTES
Assessment/Plan:     Diagnoses and all orders for this visit:    Encounter for well adult exam without abnormal findings    BMI 34 0-34 9,adult    Abnormal finding on thyroid function test  -     TSH, 3rd generation; Future  -     TSH, 3rd generation    Screening for cardiovascular condition  -     CBC; Future  -     Comprehensive metabolic panel; Future  -     Lipid panel; Future  -     CBC  -     Comprehensive metabolic panel  -     Lipid panel    Screening for prostate cancer  -     UA (URINE) with reflex to Scope; Future  -     PSA Total (Reflex To Free); Future  -     UA (URINE) with reflex to Scope  -     PSA Total (Reflex To Free)    Lyme disease  -     Lyme Total Antibody Profile with reflex to WB; Future  -     Sedimentation rate, automated; Future  -     Lyme Total Antibody Profile with reflex to WB  -     Sedimentation rate, automated       Labs ordered   Otherwise patient is up-to-date on health maintenance  In for monitors abdominal pain but I do believe was only muscle spasm  He can follow up in 1 year or sooner if needed      Subjective:     Chief Complaint   Patient presents with    Annual Exam     Adult well check, upper right quadrant pain after appendectomy in July    Shortness of Breath     ongoing shortness of breath for 8 years, inhaler in past has not helped        Patient ID: Melania Banerjee is a 51 y o  male  Here for physical   Hx of lymes patient has questions about this   Patient also had a question of some questionable pain in his right upper quadrant  He is not currently having any discomfort at this time      The following portions of the patient's history were reviewed and updated as appropriate: allergies, current medications, past family history, past medical history, past social history, past surgical history and problem list     Review of Systems   Constitutional: Negative  HENT: Negative  Eyes: Negative  Respiratory: Negative  Cardiovascular: Negative  Gastrointestinal: Negative  Endocrine: Negative  Genitourinary: Negative  Musculoskeletal: Negative  Skin: Negative  Allergic/Immunologic: Negative  Neurological: Negative  Hematological: Negative  Psychiatric/Behavioral: Negative  All other systems reviewed and are negative  Objective:    Vitals:    02/12/21 0809   BP: 130/84   BP Location: Left arm   Patient Position: Sitting   Cuff Size: Large   Pulse: 62   Resp: 20   Temp: 98 5 °F (36 9 °C)   TempSrc: Tympanic   SpO2: 97%   Weight: 119 kg (262 lb 12 8 oz)   Height: 6' 1 25" (1 861 m)          Physical Exam  Vitals signs and nursing note reviewed  Constitutional:       General: He is not in acute distress  Appearance: He is well-developed  HENT:      Head: Normocephalic  Right Ear: External ear normal       Left Ear: External ear normal       Nose: Nose normal    Eyes:      General:         Right eye: No discharge  Left eye: No discharge  Conjunctiva/sclera: Conjunctivae normal       Pupils: Pupils are equal, round, and reactive to light  Neck:      Musculoskeletal: Normal range of motion  Cardiovascular:      Rate and Rhythm: Normal rate and regular rhythm  Heart sounds: Normal heart sounds  Pulmonary:      Effort: Pulmonary effort is normal       Breath sounds: Normal breath sounds  Abdominal:      General: Bowel sounds are normal  There is no distension  Palpations: Abdomen is soft  Tenderness: There is no abdominal tenderness  Musculoskeletal: Normal range of motion  Skin:     General: Skin is warm and dry  Findings: No rash  Neurological:      Mental Status: He is alert and oriented to person, place, and time  Cranial Nerves: No cranial nerve deficit  Psychiatric:         Behavior: Behavior normal          Thought Content: Thought content normal          Judgment: Judgment normal          BMI Counseling: Body mass index is 34 44 kg/m²   The BMI is above normal  Nutrition recommendations include reducing portion sizes, decreasing overall calorie intake, 3-5 servings of fruits/vegetables daily, reducing fast food intake, consuming healthier snacks, decreasing soda and/or juice intake, moderation in carbohydrate intake, increasing intake of lean protein, reducing intake of saturated fat and trans fat and reducing intake of cholesterol  Exercise recommendations include exercising 3-5 times per week

## 2021-02-17 LAB
ALBUMIN SERPL-MCNC: 4.3 G/DL (ref 4–5)
ALBUMIN/GLOB SERPL: 1.8 {RATIO} (ref 1.2–2.2)
ALP SERPL-CCNC: 59 IU/L (ref 39–117)
ALT SERPL-CCNC: 29 IU/L (ref 0–44)
APPEARANCE UR: CLEAR
AST SERPL-CCNC: 18 IU/L (ref 0–40)
B BURGDOR IGG+IGM SER-ACNC: <0.91 ISR (ref 0–0.9)
BILIRUB SERPL-MCNC: 0.6 MG/DL (ref 0–1.2)
BILIRUB UR QL STRIP: NEGATIVE
BUN SERPL-MCNC: 15 MG/DL (ref 6–24)
BUN/CREAT SERPL: 11 (ref 9–20)
CALCIUM SERPL-MCNC: 9.3 MG/DL (ref 8.7–10.2)
CHLORIDE SERPL-SCNC: 102 MMOL/L (ref 96–106)
CHOLEST SERPL-MCNC: 175 MG/DL (ref 100–199)
CHOLEST/HDLC SERPL: 5.5 RATIO (ref 0–5)
CO2 SERPL-SCNC: 24 MMOL/L (ref 20–29)
COLOR UR: YELLOW
CREAT SERPL-MCNC: 1.36 MG/DL (ref 0.76–1.27)
ERYTHROCYTE [DISTWIDTH] IN BLOOD BY AUTOMATED COUNT: 12.5 % (ref 11.6–15.4)
ERYTHROCYTE [SEDIMENTATION RATE] IN BLOOD BY WESTERGREN METHOD: 8 MM/HR (ref 0–15)
GLOBULIN SER-MCNC: 2.4 G/DL (ref 1.5–4.5)
GLUCOSE SERPL-MCNC: 93 MG/DL (ref 65–99)
GLUCOSE UR QL: NEGATIVE
HCT VFR BLD AUTO: 43.6 % (ref 37.5–51)
HDLC SERPL-MCNC: 32 MG/DL
HGB BLD-MCNC: 15.3 G/DL (ref 13–17.7)
HGB UR QL STRIP: NEGATIVE
KETONES UR QL STRIP: NEGATIVE
LDLC SERPL CALC-MCNC: 114 MG/DL (ref 0–99)
LEUKOCYTE ESTERASE UR QL STRIP: NEGATIVE
MCH RBC QN AUTO: 31 PG (ref 26.6–33)
MCHC RBC AUTO-ENTMCNC: 35.1 G/DL (ref 31.5–35.7)
MCV RBC AUTO: 88 FL (ref 79–97)
MICRO URNS: NORMAL
NITRITE UR QL STRIP: NEGATIVE
PH UR STRIP: 6 [PH] (ref 5–7.5)
PLATELET # BLD AUTO: 233 X10E3/UL (ref 150–450)
POTASSIUM SERPL-SCNC: 4.4 MMOL/L (ref 3.5–5.2)
PROT SERPL-MCNC: 6.7 G/DL (ref 6–8.5)
PROT UR QL STRIP: NEGATIVE
PSA SERPL-MCNC: 0.8 NG/ML (ref 0–4)
RBC # BLD AUTO: 4.93 X10E6/UL (ref 4.14–5.8)
SL AMB EGFR AFRICAN AMERICAN: 71 ML/MIN/1.73
SL AMB EGFR NON AFRICAN AMERICAN: 62 ML/MIN/1.73
SL AMB REFLEX CRITERIA: NORMAL
SL AMB VLDL CHOLESTEROL CALC: 29 MG/DL (ref 5–40)
SODIUM SERPL-SCNC: 137 MMOL/L (ref 134–144)
SP GR UR: 1.02 (ref 1–1.03)
TRIGL SERPL-MCNC: 163 MG/DL (ref 0–149)
TSH SERPL DL<=0.005 MIU/L-ACNC: 3.5 UIU/ML (ref 0.45–4.5)
UROBILINOGEN UR STRIP-ACNC: 0.2 MG/DL (ref 0.2–1)
WBC # BLD AUTO: 6.7 X10E3/UL (ref 3.4–10.8)

## 2021-03-10 DIAGNOSIS — Z23 ENCOUNTER FOR IMMUNIZATION: ICD-10-CM

## 2021-03-21 ENCOUNTER — IMMUNIZATIONS (OUTPATIENT)
Dept: FAMILY MEDICINE CLINIC | Facility: HOSPITAL | Age: 46
End: 2021-03-21

## 2021-03-21 DIAGNOSIS — Z23 ENCOUNTER FOR IMMUNIZATION: Primary | ICD-10-CM

## 2021-03-21 PROCEDURE — 91300 SARS-COV-2 / COVID-19 MRNA VACCINE (PFIZER-BIONTECH) 30 MCG: CPT

## 2021-03-21 PROCEDURE — 0001A SARS-COV-2 / COVID-19 MRNA VACCINE (PFIZER-BIONTECH) 30 MCG: CPT

## 2021-04-11 ENCOUNTER — IMMUNIZATIONS (OUTPATIENT)
Dept: FAMILY MEDICINE CLINIC | Facility: HOSPITAL | Age: 46
End: 2021-04-11

## 2021-04-11 DIAGNOSIS — Z23 ENCOUNTER FOR IMMUNIZATION: Primary | ICD-10-CM

## 2021-04-11 PROCEDURE — 0002A SARS-COV-2 / COVID-19 MRNA VACCINE (PFIZER-BIONTECH) 30 MCG: CPT

## 2021-04-11 PROCEDURE — 91300 SARS-COV-2 / COVID-19 MRNA VACCINE (PFIZER-BIONTECH) 30 MCG: CPT

## 2021-07-20 ENCOUNTER — OFFICE VISIT (OUTPATIENT)
Dept: URGENT CARE | Facility: CLINIC | Age: 46
End: 2021-07-20
Payer: COMMERCIAL

## 2021-07-20 VITALS
WEIGHT: 260 LBS | HEIGHT: 72 IN | BODY MASS INDEX: 35.21 KG/M2 | HEART RATE: 93 BPM | OXYGEN SATURATION: 97 % | RESPIRATION RATE: 18 BRPM | TEMPERATURE: 97.1 F

## 2021-07-20 DIAGNOSIS — J01.10 ACUTE FRONTAL SINUSITIS, RECURRENCE NOT SPECIFIED: Primary | ICD-10-CM

## 2021-07-20 PROCEDURE — 99213 OFFICE O/P EST LOW 20 MIN: CPT | Performed by: PHYSICIAN ASSISTANT

## 2021-07-20 RX ORDER — AZITHROMYCIN 250 MG/1
TABLET, FILM COATED ORAL
Qty: 6 TABLET | Refills: 0 | Status: SHIPPED | OUTPATIENT
Start: 2021-07-20 | End: 2021-07-24

## 2021-07-20 NOTE — PROGRESS NOTES
[facility]        NAME: Siobhan Snyder is a 51 y o  male  : 1975    MRN: [id number]  DATE: 2021  TIME: 11:32 PM    Assessment and Plan   Acute frontal sinusitis, recurrence not specified [J01 10]  1  Acute frontal sinusitis, recurrence not specified  azithromycin (ZITHROMAX) 250 mg tablet         Patient Instructions       Follow up with PCP in 3-5 days  Proceed to  ER if symptoms worsen  Chief Complaint     Chief Complaint   Patient presents with    Cold Like Symptoms     Patient reports symptoms for 1 day  He reports sore throat for approx 3 days  States he has nasal congestion, fever and eye pain   Fatigue    Eye Pain    Fever    Sore Throat         History of Present Illness        70-year-old male presents the clinic with sore throat, nasal congestion, fevers, eye pain, fatigue  Patient states symptoms started several days ago  Patient has an Allergy to pollen, took Nyquil yesterday and zycam  No other sick contacts at home  Pt is vaccinated  Symptoms started  afternoon and ST improved but then started to feel congestion worsening in nasal passages  Review of Systems   Review of Systems   Constitutional: Positive for chills, fatigue and fever (99 4)  Negative for appetite change  HENT: Positive for congestion, postnasal drip, rhinorrhea (intermittent), sinus pressure and sore throat (improved)  Negative for ear pain  Negative loss of sense of taste or smell   Eyes: Positive for photophobia  Negative for pain, discharge and visual disturbance  Respiratory: Negative for cough, chest tightness, shortness of breath and wheezing  Gastrointestinal: Negative for abdominal pain, diarrhea, nausea and vomiting  Musculoskeletal: Negative for myalgias  Skin: Negative for rash  Neurological: Negative for dizziness, light-headedness and headaches  Current Medications     No current outpatient medications on file      Current Allergies Allergies as of 07/20/2021    (No Known Allergies)            The following portions of the patient's history were reviewed and updated as appropriate: allergies, current medications, past family history, past medical history, past social history, past surgical history and problem list      Past Medical History:   Diagnosis Date    Known health problems: none     Patient denies medical problems 03/04/2020    Patient denies medical problems 03/23/2020       Past Surgical History:   Procedure Laterality Date    COLONOSCOPY      EGD      ND LAP,APPENDECTOMY N/A 7/28/2020    Procedure: Jason Queen;  Surgeon: Honey Yeager MD;  Location:  MAIN OR;  Service: General    ND REPAIR UMBILICAL ZCYF,6+T/F,SRHNM N/A 7/28/2020    Procedure: REPAIR HERNIA UMBILICAL;  Surgeon: Honey Yeager MD;  Location:  MAIN OR;  Service: General    WISDOM TOOTH EXTRACTION         Family History   Problem Relation Age of Onset    No Known Problems Mother     Diabetes Father     No Known Problems Sister     Colorectal Cancer Neg Hx     Colon polyps Neg Hx          Medications have been verified  Objective   Pulse 93   Temp (!) 97 1 °F (36 2 °C) (Temporal)   Resp 18   Ht 6' (1 829 m)   Wt 118 kg (260 lb)   SpO2 97%   BMI 35 26 kg/m²   No LMP for male patient  Physical Exam     Physical Exam  Vitals and nursing note reviewed  Constitutional:       General: He is not in acute distress  Appearance: He is well-developed  He is not diaphoretic  HENT:      Head: Normocephalic and atraumatic  Right Ear: Tympanic membrane normal       Left Ear: Tympanic membrane normal       Nose: Mucosal edema and congestion present  Right Sinus: Frontal sinus tenderness present  Left Sinus: Frontal sinus tenderness present  Mouth/Throat:      Pharynx: Uvula midline  Posterior oropharyngeal erythema present     Eyes:      Conjunctiva/sclera: Conjunctivae normal    Cardiovascular: Rate and Rhythm: Normal rate and regular rhythm  Heart sounds: Normal heart sounds  Pulmonary:      Effort: Pulmonary effort is normal       Breath sounds: Normal breath sounds  Musculoskeletal:         General: Normal range of motion  Skin:     General: Skin is warm and dry  Neurological:      Mental Status: He is alert and oriented to person, place, and time

## 2021-07-20 NOTE — PATIENT INSTRUCTIONS

## 2022-02-15 ENCOUNTER — OFFICE VISIT (OUTPATIENT)
Dept: FAMILY MEDICINE CLINIC | Facility: CLINIC | Age: 47
End: 2022-02-15
Payer: COMMERCIAL

## 2022-02-15 ENCOUNTER — APPOINTMENT (OUTPATIENT)
Dept: RADIOLOGY | Facility: CLINIC | Age: 47
End: 2022-02-15
Payer: COMMERCIAL

## 2022-02-15 VITALS
TEMPERATURE: 97 F | BODY MASS INDEX: 35.84 KG/M2 | HEART RATE: 65 BPM | OXYGEN SATURATION: 97 % | HEIGHT: 72 IN | WEIGHT: 264.6 LBS | RESPIRATION RATE: 18 BRPM | DIASTOLIC BLOOD PRESSURE: 78 MMHG | SYSTOLIC BLOOD PRESSURE: 124 MMHG

## 2022-02-15 DIAGNOSIS — M54.12 CERVICAL RADICULOPATHY: ICD-10-CM

## 2022-02-15 DIAGNOSIS — M54.2 NECK PAIN: ICD-10-CM

## 2022-02-15 DIAGNOSIS — Z13.6 SCREENING FOR CARDIOVASCULAR CONDITION: ICD-10-CM

## 2022-02-15 DIAGNOSIS — R00.2 FLUTTERING SENSATION OF HEART: ICD-10-CM

## 2022-02-15 DIAGNOSIS — R06.02 SOB (SHORTNESS OF BREATH): ICD-10-CM

## 2022-02-15 DIAGNOSIS — Z00.00 ANNUAL PHYSICAL EXAM: Primary | ICD-10-CM

## 2022-02-15 DIAGNOSIS — R42 EPISODIC LIGHTHEADEDNESS: ICD-10-CM

## 2022-02-15 DIAGNOSIS — Z13.1 SCREENING FOR DIABETES MELLITUS: ICD-10-CM

## 2022-02-15 DIAGNOSIS — E66.9 OBESITY (BMI 35.0-39.9 WITHOUT COMORBIDITY): ICD-10-CM

## 2022-02-15 PROCEDURE — 3008F BODY MASS INDEX DOCD: CPT | Performed by: FAMILY MEDICINE

## 2022-02-15 PROCEDURE — 99396 PREV VISIT EST AGE 40-64: CPT | Performed by: FAMILY MEDICINE

## 2022-02-15 PROCEDURE — 1036F TOBACCO NON-USER: CPT | Performed by: FAMILY MEDICINE

## 2022-02-15 PROCEDURE — 3725F SCREEN DEPRESSION PERFORMED: CPT | Performed by: FAMILY MEDICINE

## 2022-02-15 PROCEDURE — 72050 X-RAY EXAM NECK SPINE 4/5VWS: CPT

## 2022-02-15 RX ORDER — CAYENNE 450 MG
CAPSULE ORAL
COMMUNITY
End: 2022-07-15

## 2022-02-15 NOTE — PROGRESS NOTES
[facility] PRACTICE    NAME: Lidia Connell  AGE: 49 y o  SEX: male  : 1975     DATE: 2/15/2022     Assessment and Plan:     Problem List Items Addressed This Visit        Nervous and Auditory    Cervical radiculopathy    Relevant Orders    XR spine cervical complete 4 or 5 vw non injury    Lipid panel    Comprehensive metabolic panel    CBC and differential    TSH, 3rd generation with Free T4 reflex    UA (URINE) with reflex to Scope    Folate    Vitamin B12       Other    Annual physical exam - Primary    Relevant Orders    Lipid panel    Comprehensive metabolic panel    CBC and differential    TSH, 3rd generation with Free T4 reflex    UA (URINE) with reflex to Scope    Folate    Vitamin B12    Screening for diabetes mellitus    Relevant Orders    Lipid panel    Comprehensive metabolic panel    Screening for cardiovascular condition    Relevant Orders    Lipid panel    Comprehensive metabolic panel    Obesity (BMI 35 0-39 9 without comorbidity)     BMI Counseling: Body mass index is 35 89 kg/m²  The BMI is above normal  Nutrition recommendations include reducing portion sizes, decreasing overall calorie intake and 3-5 servings of fruits/vegetables daily  Exercise recommendations include exercising 3-5 times per week             Relevant Orders    Lipid panel    Comprehensive metabolic panel    CBC and differential    TSH, 3rd generation with Free T4 reflex    UA (URINE) with reflex to Scope    Folate    Vitamin B12    SOB (shortness of breath)    Relevant Orders    Lipid panel    Comprehensive metabolic panel    CBC and differential    TSH, 3rd generation with Free T4 reflex    UA (URINE) with reflex to Scope    Folate    Vitamin B12    Episodic lightheadedness    Relevant Orders    Lipid panel    Comprehensive metabolic panel    CBC and differential    TSH, 3rd generation with Free T4 reflex    UA (URINE) with reflex to Scope Folate    Vitamin B12    Fluttering sensation of heart    Relevant Orders    Lipid panel    Comprehensive metabolic panel    CBC and differential    TSH, 3rd generation with Free T4 reflex    UA (URINE) with reflex to Scope    Folate    Vitamin B12    Neck pain    Relevant Orders    XR spine cervical complete 4 or 5 vw non injury        Offered cardiac testing for patients above concerns  He actually reports he is planning on seeing a cardiologist in the coming weeks  He has had these symptoms for years  Pulmonary work up was negative  Will follow up with cardiology and please let us know if he should have any questions or concerns  Immunizations and preventive care screenings were discussed with patient today  Appropriate education was printed on patient's after visit summary  Counseling:  Alcohol/drug use: discussed moderation in alcohol intake, the recommendations for healthy alcohol use, and avoidance of illicit drug use  Dental Health: discussed importance of regular tooth brushing, flossing, and dental visits  Injury prevention: discussed safety/seat belts, safety helmets, smoke detectors, carbon dioxide detectors, and smoking near bedding or upholstery  Sexual health: discussed sexually transmitted diseases, partner selection, use of condoms, avoidance of unintended pregnancy, and contraceptive alternatives  · Exercise: the importance of regular exercise/physical activity was discussed  Recommend exercise 3-5 times per week for at least 30 minutes  Return in about 1 year (around 2/15/2023) for Annual physical      Chief Complaint:     Chief Complaint   Patient presents with    Annual Exam     Pt reports he gets winded and SOB  He reports he cannot sustain speech, has to stop and take a deep breath  Denies tightness in chest  Has had testing in the past which showed no lung issue  Pt reports dizziness when changing positions       Neck Pain     Pt reports worsening neck pain over davion  Saw chiropractor x3  Has some numbness to left fingers  Reports its always sore  Using heat  History of Present Illness:     Adult Annual Physical   Patient here for a comprehensive physical exam    Concerned about sob which has been going on  Has seen specialist in the past and was told his lungs were fine  SOB is happening even at rest it comes and goes  Feels like it is getting worse over the last 10 years  Sometimes gets lightheaded if moving to quickly     Neck pain- reports long history of neck pain  Has seen chiropractors for years  Now having some numbness in his fingers now  Works on stretching    Diet and Physical Activity  · Diet/Nutrition: well balanced diet  · Exercise: moderate cardiovascular exercise  Depression Screening  PHQ-2/9 Depression Screening    Little interest or pleasure in doing things: 0 - not at all  Feeling down, depressed, or hopeless: 0 - not at all  PHQ-2 Score: 0  PHQ-2 Interpretation: Negative depression screen          Review of Systems:     Review of Systems   Constitutional: Negative for chills and fever  HENT: Negative for congestion, postnasal drip, rhinorrhea and sinus pain  Eyes: Negative for photophobia and visual disturbance  Respiratory: Positive for shortness of breath  Negative for cough  Cardiovascular: Positive for palpitations  Negative for chest pain and leg swelling  Gastrointestinal: Negative for abdominal pain, constipation, diarrhea, nausea and vomiting  Genitourinary: Negative for difficulty urinating and dysuria  Musculoskeletal: Positive for back pain and neck pain  Negative for arthralgias and myalgias  Skin: Negative for rash  Neurological: Negative for dizziness and syncope        Past Medical History:     Past Medical History:   Diagnosis Date    Allergic 1975    Known health problems: none     Patient denies medical problems 03/04/2020    Patient denies medical problems 03/23/2020      Past Surgical History:     Past Surgical History:   Procedure Laterality Date    COLONOSCOPY      EGD      KS LAP,APPENDECTOMY N/A 7/28/2020    Procedure: APPENDECTOMY LAPAROSCOPIC;  Surgeon: Clovis Reno MD;  Location: [facility] OR;  Service: General    KS REPAIR UMBILICAL TPEI,0+W/Q,GCGLT N/A 7/28/2020    Procedure: REPAIR HERNIA UMBILICAL;  Surgeon: Clovis Reno MD;  Location: [facility];  Service: General    WISDOM TOOTH EXTRACTION        Family History:     Family History   Problem Relation Age of Onset   Cyndi Mariposa Stroke Mother     Diabetes Mother     Diabetes Father     Hearing loss Father     Vision loss Father     Glaucoma Father     No Known Problems Sister     Colorectal Cancer Neg Hx     Colon polyps Neg Hx       Social History:     Social History     Socioeconomic History    Marital status:      Spouse name: None    Number of children: None    Years of education: None    Highest education level: None   Occupational History    None   Tobacco Use    Smoking status: Never Smoker    Smokeless tobacco: Never Used   Vaping Use    Vaping Use: Never used   Substance and Sexual Activity    Alcohol use:  Yes     Alcohol/week: 5 0 standard drinks     Types: 1 Glasses of wine, 4 Cans of beer per week     Comment: beer or scotch    Drug use: Never     Comment: Denies use    Sexual activity: Yes     Partners: Female     Birth control/protection: Male Sterilization   Other Topics Concern    None   Social History Narrative    None     Social Determinants of Health     Financial Resource Strain: Not on file   Food Insecurity: Not on file   Transportation Needs: Not on file   Physical Activity: Not on file   Stress: Not on file   Social Connections: Not on file   Intimate Partner Violence: Not At Risk    Fear of Current or Ex-Partner: No    Emotionally Abused: No    Physically Abused: No    Sexually Abused: No   Housing Stability: Not on file      Current Medications:     Current Outpatient Medications   Medication Sig Dispense Refill    Probiotic Product (Acidophilus) CHEW Chew (Patient not taking: Reported on 2/15/2022 )       No current facility-administered medications for this visit  Allergies:     No Known Allergies   Physical Exam:     /78 (BP Location: Right arm, Patient Position: Sitting, Cuff Size: Large)   Pulse 65   Temp (!) 97 °F (36 1 °C) (Tympanic)   Resp 18   Ht 6' (1 829 m)   Wt 120 kg (264 lb 9 6 oz)   SpO2 97%   BMI 35 89 kg/m²     Physical Exam  Constitutional:       General: He is not in acute distress  Appearance: Normal appearance  He is not ill-appearing, toxic-appearing or diaphoretic  HENT:      Head: Normocephalic and atraumatic  Right Ear: Tympanic membrane and ear canal normal       Left Ear: Tympanic membrane and ear canal normal       Nose: Nose normal  No congestion  Mouth/Throat:      Mouth: Mucous membranes are moist       Pharynx: Oropharynx is clear  No oropharyngeal exudate  Eyes:      Extraocular Movements: Extraocular movements intact  Conjunctiva/sclera: Conjunctivae normal       Pupils: Pupils are equal, round, and reactive to light  Cardiovascular:      Rate and Rhythm: Normal rate and regular rhythm  Pulses: Normal pulses  Heart sounds: No murmur heard  Pulmonary:      Effort: Pulmonary effort is normal       Breath sounds: Normal breath sounds  No wheezing, rhonchi or rales  Abdominal:      General: Bowel sounds are normal  There is no distension  Palpations: Abdomen is soft  Tenderness: There is no abdominal tenderness  Musculoskeletal:         General: No swelling or tenderness  Normal range of motion  Cervical back: Normal range of motion and neck supple  Spasms present  Skin:     General: Skin is warm and dry  Capillary Refill: Capillary refill takes less than 2 seconds  Neurological:      General: No focal deficit present        Mental Status: He is alert and oriented to person, place, and time  Cranial Nerves: No cranial nerve deficit  Psychiatric:         Mood and Affect: Mood normal          Behavior: Behavior normal          Thought Content:  Thought content normal           Murabhishek Pina DO  [address]

## 2022-02-15 NOTE — ASSESSMENT & PLAN NOTE
BMI Counseling: Body mass index is 35 89 kg/m²  The BMI is above normal  Nutrition recommendations include reducing portion sizes, decreasing overall calorie intake and 3-5 servings of fruits/vegetables daily  Exercise recommendations include exercising 3-5 times per week

## 2022-02-15 NOTE — PATIENT INSTRUCTIONS

## 2022-02-19 LAB
ALBUMIN SERPL-MCNC: 4.6 G/DL (ref 4–5)
ALBUMIN/GLOB SERPL: 2 {RATIO} (ref 1.2–2.2)
ALP SERPL-CCNC: 59 IU/L (ref 44–121)
ALT SERPL-CCNC: 29 IU/L (ref 0–44)
APPEARANCE UR: CLEAR
AST SERPL-CCNC: 19 IU/L (ref 0–40)
BASOPHILS # BLD AUTO: 0.1 X10E3/UL (ref 0–0.2)
BASOPHILS NFR BLD AUTO: 1 %
BILIRUB SERPL-MCNC: 0.7 MG/DL (ref 0–1.2)
BILIRUB UR QL STRIP: NEGATIVE
BUN SERPL-MCNC: 14 MG/DL (ref 6–24)
BUN/CREAT SERPL: 11 (ref 9–20)
CALCIUM SERPL-MCNC: 9.6 MG/DL (ref 8.7–10.2)
CHLORIDE SERPL-SCNC: 100 MMOL/L (ref 96–106)
CHOLEST SERPL-MCNC: 203 MG/DL (ref 100–199)
CHOLEST/HDLC SERPL: 6.3 RATIO (ref 0–5)
CO2 SERPL-SCNC: 21 MMOL/L (ref 20–29)
COLOR UR: YELLOW
CREAT SERPL-MCNC: 1.23 MG/DL (ref 0.76–1.27)
EOSINOPHIL # BLD AUTO: 0.1 X10E3/UL (ref 0–0.4)
EOSINOPHIL NFR BLD AUTO: 2 %
ERYTHROCYTE [DISTWIDTH] IN BLOOD BY AUTOMATED COUNT: 13 % (ref 11.6–15.4)
FOLATE SERPL-MCNC: 9.6 NG/ML
GLOBULIN SER-MCNC: 2.3 G/DL (ref 1.5–4.5)
GLUCOSE SERPL-MCNC: 90 MG/DL (ref 65–99)
GLUCOSE UR QL: NEGATIVE
HCT VFR BLD AUTO: 44.6 % (ref 37.5–51)
HDLC SERPL-MCNC: 32 MG/DL
HGB BLD-MCNC: 15.1 G/DL (ref 13–17.7)
HGB UR QL STRIP: NEGATIVE
IMM GRANULOCYTES # BLD: 0 X10E3/UL (ref 0–0.1)
IMM GRANULOCYTES NFR BLD: 0 %
KETONES UR QL STRIP: NEGATIVE
LDLC SERPL CALC-MCNC: 145 MG/DL (ref 0–99)
LEUKOCYTE ESTERASE UR QL STRIP: NEGATIVE
LYMPHOCYTES # BLD AUTO: 2.2 X10E3/UL (ref 0.7–3.1)
LYMPHOCYTES NFR BLD AUTO: 32 %
MCH RBC QN AUTO: 30.6 PG (ref 26.6–33)
MCHC RBC AUTO-ENTMCNC: 33.9 G/DL (ref 31.5–35.7)
MCV RBC AUTO: 90 FL (ref 79–97)
MICRO URNS: NORMAL
MONOCYTES # BLD AUTO: 0.8 X10E3/UL (ref 0.1–0.9)
MONOCYTES NFR BLD AUTO: 11 %
NEUTROPHILS # BLD AUTO: 3.7 X10E3/UL (ref 1.4–7)
NEUTROPHILS NFR BLD AUTO: 54 %
NITRITE UR QL STRIP: NEGATIVE
PH UR STRIP: 6.5 [PH] (ref 5–7.5)
PLATELET # BLD AUTO: 249 X10E3/UL (ref 150–450)
POTASSIUM SERPL-SCNC: 4.6 MMOL/L (ref 3.5–5.2)
PROT SERPL-MCNC: 6.9 G/DL (ref 6–8.5)
PROT UR QL STRIP: NEGATIVE
RBC # BLD AUTO: 4.94 X10E6/UL (ref 4.14–5.8)
SL AMB EGFR AFRICAN AMERICAN: 80 ML/MIN/1.73
SL AMB EGFR NON AFRICAN AMERICAN: 69 ML/MIN/1.73
SL AMB T4, FREE (DIRECT): 1.2 NG/DL (ref 0.82–1.77)
SL AMB VLDL CHOLESTEROL CALC: 26 MG/DL (ref 5–40)
SODIUM SERPL-SCNC: 138 MMOL/L (ref 134–144)
SP GR UR: 1.01 (ref 1–1.03)
TRIGL SERPL-MCNC: 144 MG/DL (ref 0–149)
TSH SERPL DL<=0.005 MIU/L-ACNC: 4.75 UIU/ML (ref 0.45–4.5)
UROBILINOGEN UR STRIP-ACNC: 0.2 MG/DL (ref 0.2–1)
VIT B12 SERPL-MCNC: 266 PG/ML (ref 232–1245)
WBC # BLD AUTO: 6.9 X10E3/UL (ref 3.4–10.8)

## 2022-02-21 DIAGNOSIS — R79.89 ABNORMAL TSH: Primary | ICD-10-CM

## 2022-02-24 ENCOUNTER — TELEPHONE (OUTPATIENT)
Dept: FAMILY MEDICINE CLINIC | Facility: CLINIC | Age: 47
End: 2022-02-24

## 2022-02-24 NOTE — TELEPHONE ENCOUNTER
Patient was returning your call  He is not on the [facility] right now, he is returning on 3/7  He will not be able to redo his labs right now    Also can you call him when you get a chance

## 2022-03-12 LAB
THYROGLOB AB SERPL-ACNC: <1 IU/ML (ref 0–0.9)
THYROPEROXIDASE AB SERPL-ACNC: <8 IU/ML (ref 0–34)
TSH SERPL DL<=0.005 MIU/L-ACNC: 4.21 UIU/ML (ref 0.45–4.5)

## 2022-07-15 ENCOUNTER — CONSULT (OUTPATIENT)
Dept: CARDIOLOGY CLINIC | Facility: CLINIC | Age: 47
End: 2022-07-15
Payer: COMMERCIAL

## 2022-07-15 VITALS
HEART RATE: 66 BPM | SYSTOLIC BLOOD PRESSURE: 118 MMHG | BODY MASS INDEX: 33.75 KG/M2 | WEIGHT: 249.2 LBS | HEIGHT: 72 IN | OXYGEN SATURATION: 94 % | DIASTOLIC BLOOD PRESSURE: 72 MMHG

## 2022-07-15 DIAGNOSIS — R06.02 SOB (SHORTNESS OF BREATH): Primary | ICD-10-CM

## 2022-07-15 DIAGNOSIS — Z13.6 SCREENING FOR CARDIOVASCULAR CONDITION: ICD-10-CM

## 2022-07-15 PROCEDURE — 99244 OFF/OP CNSLTJ NEW/EST MOD 40: CPT | Performed by: INTERNAL MEDICINE

## 2022-07-15 PROCEDURE — 93000 ELECTROCARDIOGRAM COMPLETE: CPT | Performed by: INTERNAL MEDICINE

## 2022-07-15 NOTE — PROGRESS NOTES
Cardiology Consultation     Kameron Fee  [id number]  1975  [facility] [address] [address] [phone]    1  SOB (shortness of breath)  POCT ECG    Echo complete w/ contrast if indicated    Stress test only, exercise   2  Screening for cardiovascular condition       Chief Complaint   Patient presents with    Shortness of Breath     New pt  SOB > 6 years  HPI: Patient is here for cardiac evaluation of shortness of breath that has been ongoing for over 6 years  He notes it to be sporadic, at times when he climbs stairs  The more he exercises, the better he feels  He does note some dizziness with getting up occasionally as well  At times he also feels short of breath while talking  No reported chest pain, palpitations, syncope, LE edema, orthopnea, PND, or significant weight changes  Patient remains active without any increased fatigue out of the ordinary        Patient Active Problem List   Diagnosis    Right upper quadrant pain    Abnormal finding on thyroid function test    Annual physical exam    Screening for diabetes mellitus    Screening for cardiovascular condition    Screening for HIV (human immunodeficiency virus)    Obesity (BMI 35 0-39 9 without comorbidity)    Elevated BP without diagnosis of hypertension    Malignant melanoma of face ([address] )    Hepatomegaly    SOB (shortness of breath)    Episodic lightheadedness    Fluttering sensation of heart    Neck pain    Cervical radiculopathy     Past Medical History:   Diagnosis Date    Allergic 1975    Known health problems: none     Patient denies medical problems 03/04/2020    Patient denies medical problems 03/23/2020     Social History     Socioeconomic History    Marital status:      Spouse name: Not on file    Number of children: Not on file    Years of education: Not on file   [employer] Highest education level: Not on file   Occupational History    Not on file   Tobacco Use    Smoking status: Never Smoker    Smokeless tobacco: Never Used   Vaping Use    Vaping Use: Never used   Substance and Sexual Activity    Alcohol use: Yes     Alcohol/week: 5 0 standard drinks     Types: 1 Glasses of wine, 4 Cans of beer per week     Comment: beer or scotch    Drug use: Never     Comment: Denies use    Sexual activity: Yes     Partners: Female     Birth control/protection: Male Sterilization   Other Topics Concern    Not on file   Social History Narrative    Not on file     Social Determinants of Health     Financial Resource Strain: Not on file   Food Insecurity: Not on file   Transportation Needs: Not on file   Physical Activity: Not on file   Stress: Not on file   Social Connections: Not on file   Intimate Partner Violence: Not At Risk    Fear of Current or Ex-Partner: No    Emotionally Abused: No    Physically Abused: No    Sexually Abused: No   Housing Stability: Not on file      Family History   Problem Relation Age of Onset    Stroke Mother     Diabetes Mother     Diabetes Father     Hearing loss Father     Vision loss Father     Glaucoma Father     No Known Problems Sister     Colorectal Cancer Neg Hx     Colon polyps Neg Hx      Past Surgical History:   Procedure Laterality Date    COLONOSCOPY      EGD      ID LAP,APPENDECTOMY N/A 7/28/2020    Procedure: APPENDECTOMY LAPAROSCOPIC;  Surgeon: Leighann Benites MD;  Location:  MAIN OR;  Service: General    ID REPAIR UMBILICAL HLRI,7+J/M,OELJV N/A 7/28/2020    Procedure: REPAIR HERNIA UMBILICAL;  Surgeon: Leighann Benites MD;  Location:  MAIN OR;  Service: General    WISDOM TOOTH EXTRACTION       No current outpatient medications on file    No Known Allergies  Vitals:    07/15/22 1505   BP: 118/72   BP Location: Left arm   Patient Position: Sitting   Cuff Size: Large   Pulse: 66   SpO2: 94%   Weight: 113 kg (249 lb 3 2 oz) Height: 6' (1 829 m)       Labs:  Orders Only on 02/21/2022   Component Date Value    TSH 03/11/2022 4 210     THYROID MICROSOMAL ANTIB* 03/11/2022 <8     Thyroglobulin Ab 03/11/2022 <1 0    Office Visit on 02/15/2022   Component Date Value    Cholesterol, Total 02/18/2022 203 (A)    Triglycerides 02/18/2022 144     HDL 02/18/2022 32 (A)    VLDL Cholesterol Calcula* 02/18/2022 26     LDL Calculated 02/18/2022 145 (A)    T   Chol/HDL Ratio 02/18/2022 6 3 (A)    Glucose, Random 02/18/2022 90     BUN 02/18/2022 14     Creatinine 02/18/2022 1 23     eGFR Non  02/18/2022 69     eGFR  02/18/2022 80     SL AMB BUN/CREATININE RA* 02/18/2022 11     Sodium 02/18/2022 138     Potassium 02/18/2022 4 6     Chloride 02/18/2022 100     CO2 02/18/2022 21     CALCIUM 02/18/2022 9 6     Protein, Total 02/18/2022 6 9     Albumin 02/18/2022 4 6     Globulin, Total 02/18/2022 2 3     Albumin/Globulin Ratio 02/18/2022 2 0     TOTAL BILIRUBIN 02/18/2022 0 7     Alk Phos Isoenzymes 02/18/2022 59     AST 02/18/2022 19     ALT 02/18/2022 29     White Blood Cell Count 02/18/2022 6 9     Red Blood Cell Count 02/18/2022 4 94     Hemoglobin 02/18/2022 15 1     HCT 02/18/2022 44 6     MCV 02/18/2022 90     MCH 02/18/2022 30 6     MCHC 02/18/2022 33 9     RDW 02/18/2022 13 0     Platelet Count 61/78/9872 249     Neutrophils 02/18/2022 54     Lymphocytes 02/18/2022 32     Monocytes 02/18/2022 11     Eosinophils 02/18/2022 2     Basophils PCT 02/18/2022 1     Neutrophils (Absolute) 02/18/2022 3 7     Lymphocytes (Absolute) 02/18/2022 2 2     Monocytes (Absolute) 02/18/2022 0 8     Eosinophils (Absolute) 02/18/2022 0 1     Basophils ABS 02/18/2022 0 1     Immature Granulocytes 02/18/2022 0     Immature Granulocytes (A* 02/18/2022 0 0     TSH 02/18/2022 4 750 (A)    Specific Gravity 02/18/2022 1 012     Ph 02/18/2022 6 5     Color UA 02/18/2022 Yellow     Urine Appearance 02/18/2022 Clear     Leukocyte Esterase 02/18/2022 Negative     Protein 02/18/2022 Negative     Glucose, 24 HR Urine 02/18/2022 Negative     Ketone, Urine 02/18/2022 Negative     Blood, Urine 02/18/2022 Negative     Bilirubin, Urine 02/18/2022 Negative     Urobilinogen Urine 02/18/2022 0 2     SL AMB NITRITES URINE, Q* 02/18/2022 Negative     Microscopic Examination 02/18/2022 Comment     FOLATE, SERUM 02/18/2022 9 6     Vitamin B-12 02/18/2022 266     T4,Free (Direct) 02/18/2022 1 20      Lab Results   Component Value Date    TRIG 144 02/18/2022    HDL 32 (L) 02/18/2022     Imaging: No results found  Review of Systems:  Review of Systems   Constitutional: Negative for activity change, appetite change, fatigue and fever  HENT: Negative for nosebleeds and sore throat  Eyes: Negative for photophobia and visual disturbance  Respiratory: Positive for shortness of breath  Negative for cough, chest tightness and wheezing  Cardiovascular: Negative for chest pain, palpitations and leg swelling  Gastrointestinal: Negative for abdominal pain, diarrhea, nausea and vomiting  Endocrine: Negative for polyuria  Genitourinary: Negative for dysuria, frequency and hematuria  Musculoskeletal: Negative for arthralgias, back pain and gait problem  Skin: Negative for pallor and rash  Neurological: Negative for dizziness, syncope, speech difficulty and light-headedness  Hematological: Does not bruise/bleed easily  Psychiatric/Behavioral: Negative for agitation, behavioral problems and confusion  Physical Exam:  Physical Exam  Vitals reviewed  Constitutional:       General: He is not in acute distress  Appearance: He is well-developed  He is not diaphoretic  HENT:      Head: Normocephalic and atraumatic  Nose: Nose normal    Eyes:      General: No scleral icterus  Pupils: Pupils are equal, round, and reactive to light  Neck:      Vascular: No JVD  Cardiovascular:      Rate and Rhythm: Normal rate and regular rhythm  Heart sounds: S1 normal and S2 normal  Heart sounds not distant  No murmur heard  No systolic murmur is present  No friction rub  No gallop  No S3 sounds  Pulmonary:      Effort: Pulmonary effort is normal  No respiratory distress  Breath sounds: Normal breath sounds  No wheezing or rales  Abdominal:      General: Bowel sounds are normal  There is no distension  Palpations: Abdomen is soft  Musculoskeletal:         General: No deformity  Cervical back: Normal range of motion and neck supple  Skin:     General: Skin is warm and dry  Findings: No erythema  Neurological:      Mental Status: He is alert and oriented to person, place, and time  Cranial Nerves: No cranial nerve deficit  Psychiatric:         Behavior: Behavior normal        Blood pressure 118/72, pulse 66, height 6' (1 829 m), weight 113 kg (249 lb 3 2 oz), SpO2 94 %  EKG:  Normal sinus rhythm  Normal ECG    Discussion/Summary:  Shortness of breath: unclear etiology  Will screen for cardiac conditions with a stress test to rule out ischemic etiology  Will also check an echocardiogram to rule out structural heart disease

## 2022-08-05 ENCOUNTER — HOSPITAL ENCOUNTER (OUTPATIENT)
Dept: NON INVASIVE DIAGNOSTICS | Age: 47
Discharge: HOME | End: 2022-08-05
Payer: COMMERCIAL

## 2022-08-05 VITALS
HEART RATE: 64 BPM | WEIGHT: 249 LBS | SYSTOLIC BLOOD PRESSURE: 142 MMHG | BODY MASS INDEX: 33.72 KG/M2 | DIASTOLIC BLOOD PRESSURE: 84 MMHG | HEIGHT: 72 IN

## 2022-08-05 DIAGNOSIS — R06.02 SOB (SHORTNESS OF BREATH): ICD-10-CM

## 2022-08-05 LAB
AORTIC ROOT: 3.3 CM
APICAL FOUR CHAMBER EJECTION FRACTION: 68 %
ASCENDING AORTA: 3.1 CM
CHEST PAIN STATEMENT: NORMAL
DOP CALC LVOT AREA: 4.15 CM2
DOP CALC LVOT DIAMETER: 2.3 CM
E WAVE DECELERATION TIME: 133 MS
FRACTIONAL SHORTENING: 55 % (ref 28–44)
INTERVENTRICULAR SEPTUM IN DIASTOLE (PARASTERNAL SHORT AXIS VIEW): 1 CM
INTERVENTRICULAR SEPTUM: 1 CM (ref 0.6–1.1)
LEFT ATRIUM AREA SYSTOLE SINGLE PLANE A4C: 16.2 CM2
LEFT ATRIUM SIZE: 3.9 CM
LEFT INTERNAL DIMENSION IN SYSTOLE: 2.5 CM (ref 2.1–4)
LEFT VENTRICULAR INTERNAL DIMENSION IN DIASTOLE: 5.5 CM (ref 3.5–6)
LEFT VENTRICULAR POSTERIOR WALL IN END DIASTOLE: 0.8 CM
LEFT VENTRICULAR STROKE VOLUME: 122 ML
LVSV (TEICH): 122 ML
MAX DIASTOLIC BP: 102 MMHG
MAX HEART RATE: 176 BPM
MAX HR PERCENT: 102 %
MAX HR: 176 BPM
MAX PREDICTED HEART RATE: 172 BPM
MAX. SYSTOLIC BP: 200 MMHG
MV E'TISSUE VEL-SEP: 11 CM/S
MV PEAK A VEL: 0.49 M/S
MV PEAK E VEL: 72 CM/S
MV STENOSIS PRESSURE HALF TIME: 39 MS
MV VALVE AREA P 1/2 METHOD: 5.64 CM2
PA SYSTOLIC PRESSURE: 24 MMHG
PROTOCOL NAME: NORMAL
RATE PRESSURE PRODUCT: NORMAL
REASON FOR TERMINATION: NORMAL
RIGHT ATRIUM AREA SYSTOLE A4C: 15.3 CM2
RIGHT VENTRICLE ID DIMENSION: 3.5 CM
SL CV LV EF: 60
SL CV PED ECHO LEFT VENTRICLE DIASTOLIC VOLUME (MOD BIPLANE) 2D: 145 ML
SL CV PED ECHO LEFT VENTRICLE SYSTOLIC VOLUME (MOD BIPLANE) 2D: 23 ML
SL CV STRESS STAGE REACHED: 4
STRESS ANGINA INDEX: 0
STRESS BASELINE HR: 68 BPM
STRESS PEAK HR: 176 BPM
STRESS POST ESTIMATED WORKLOAD: 13.4 METS
STRESS POST EXERCISE DUR MIN: 10 MIN
STRESS POST EXERCISE DUR SEC: 44 SEC
STRESS POST PEAK BP: 200 MMHG
TARGET HR FORMULA: NORMAL
TEST INDICATION: NORMAL
TIME IN EXERCISE PHASE: NORMAL
TR MAX PG: 21 MMHG
TR PEAK VELOCITY: 2.3 M/S
TRICUSPID ANNULAR PLANE SYSTOLIC EXCURSION: 1.9 CM
TRICUSPID VALVE PEAK REGURGITATION VELOCITY: 2.32 M/S

## 2022-08-05 PROCEDURE — 93306 TTE W/DOPPLER COMPLETE: CPT

## 2022-08-05 PROCEDURE — 93017 CV STRESS TEST TRACING ONLY: CPT

## 2022-08-05 PROCEDURE — 93306 TTE W/DOPPLER COMPLETE: CPT | Performed by: INTERNAL MEDICINE

## 2022-08-05 PROCEDURE — 93018 CV STRESS TEST I&R ONLY: CPT | Performed by: INTERNAL MEDICINE

## 2022-08-05 PROCEDURE — 93016 CV STRESS TEST SUPVJ ONLY: CPT | Performed by: INTERNAL MEDICINE

## 2022-08-17 ENCOUNTER — OFFICE VISIT (OUTPATIENT)
Dept: URGENT CARE | Facility: CLINIC | Age: 47
End: 2022-08-17

## 2022-08-17 VITALS
SYSTOLIC BLOOD PRESSURE: 124 MMHG | OXYGEN SATURATION: 100 % | RESPIRATION RATE: 18 BRPM | BODY MASS INDEX: 34.46 KG/M2 | WEIGHT: 260 LBS | DIASTOLIC BLOOD PRESSURE: 74 MMHG | HEART RATE: 71 BPM | HEIGHT: 73 IN | TEMPERATURE: 97 F

## 2022-08-17 DIAGNOSIS — R21 SKIN RASH: Primary | ICD-10-CM

## 2022-08-17 PROCEDURE — 99213 OFFICE O/P EST LOW 20 MIN: CPT | Performed by: PHYSICIAN ASSISTANT

## 2022-08-17 RX ORDER — CLOTRIMAZOLE AND BETAMETHASONE DIPROPIONATE 10; .64 MG/G; MG/G
CREAM TOPICAL 2 TIMES DAILY
Qty: 30 G | Refills: 0 | Status: SHIPPED | OUTPATIENT
Start: 2022-08-17

## 2022-08-17 NOTE — PROGRESS NOTES
[facility]        NAME: Mily Sinclair is a 49 y o  male  : 1975    MRN: [id number]  DATE:  2022  TIME: 1:54 PM    Assessment and Plan   Skin rash [R21]  1  Skin rash  clotrimazole-betamethasone (LOTRISONE) 1-0 05 % cream         Patient Instructions     Patient has chronic localized rash on the left temporal region of the scalp  Resembles possible tinea infection  I prescribed him Lotrisone cream and recommended to apply for at least 2-4 weeks and if condition persists, then would recommend follow-up with PCP or possible derm consult  Follow up with PCP in 3-5 days  Proceed to  ER if symptoms worsen  Chief Complaint     Chief Complaint   Patient presents with    Skin Problem     Pt reports a bump on the left side of his head for almost a year  Last night when getting a hair cut, wynne noted a red area at the bump  Pt reports itching  History of Present Illness       Patient presents with almost 1 year history of a bump on the left side of his head with a rash  He denies any significant symptoms associated with the rash outside of some itching  He reports he got a haircut last night and his wynne pointed out the issue  He denies any active drainage or bleeding from the area or any other symptoms  Review of Systems   Review of Systems   Constitutional: Negative  Respiratory: Negative  Cardiovascular: Negative  Gastrointestinal: Negative  Genitourinary: Negative  Skin: Positive for rash (Left temporal scalp)           Current Medications       Current Outpatient Medications:     clotrimazole-betamethasone (LOTRISONE) 1-0 05 % cream, Apply topically 2 (two) times a day, Disp: 30 g, Rfl: 0    Current Allergies     Allergies as of 2022    (No Known Allergies)            The following portions of the patient's history were reviewed and updated as appropriate: allergies, current medications, past family history, past medical history, past social history, past surgical history and problem list      Past Medical History:   Diagnosis Date    Allergic 1975    Known health problems: none     Patient denies medical problems 03/04/2020    Patient denies medical problems 03/23/2020       Past Surgical History:   Procedure Laterality Date    COLONOSCOPY      EGD      PA LAP,APPENDECTOMY N/A 7/28/2020    Procedure: APPENDECTOMY LAPAROSCOPIC;  Surgeon: Janine Turcios MD;  Location: [facility];  Service: General    PA REPAIR UMBILICAL TCKO,4+A/C,YKEUS N/A 7/28/2020    Procedure: REPAIR HERNIA UMBILICAL;  Surgeon: Janine Turcios MD;  Location: [facility];  Service: General    WISDOM TOOTH EXTRACTION         Family History   Problem Relation Age of Onset    Stroke Mother     Diabetes Mother     Diabetes Father     Hearing loss Father     Vision loss Father     Glaucoma Father     No Known Problems Sister     Colorectal Cancer Neg Hx     Colon polyps Neg Hx          Medications have been verified  Objective   /74   Pulse 71   Temp (!) 97 °F (36 1 °C)   Resp 18   Ht 6' 1" (1 854 m)   Wt 118 kg (260 lb)   SpO2 100%   BMI 34 30 kg/m²   No LMP for male patient  Physical Exam     Physical Exam  Vitals reviewed  Constitutional:       General: He is not in acute distress  Appearance: He is well-developed  Skin:     Comments: Left temporal scalp with small localized circumscribed mildly erythematous macular rash  No swelling or disruption to superficial skin surface  No active bleeding or discharge  No crusting or other skin changes  Neurological:      Mental Status: He is alert and oriented to person, place, and time

## 2022-11-23 LAB — HEMOCCULT STL QL IA: NEGATIVE

## 2022-12-16 LAB — HBA1C MFR BLD HPLC: 4.6 %

## 2023-01-06 ENCOUNTER — OFFICE VISIT (OUTPATIENT)
Dept: FAMILY MEDICINE CLINIC | Facility: CLINIC | Age: 48
End: 2023-01-06

## 2023-01-06 VITALS
HEART RATE: 93 BPM | HEIGHT: 73 IN | RESPIRATION RATE: 14 BRPM | TEMPERATURE: 97.8 F | OXYGEN SATURATION: 97 % | DIASTOLIC BLOOD PRESSURE: 82 MMHG | SYSTOLIC BLOOD PRESSURE: 130 MMHG | BODY MASS INDEX: 35.25 KG/M2 | WEIGHT: 266 LBS

## 2023-01-06 DIAGNOSIS — C43.30 MALIGNANT MELANOMA OF FACE (HCC): ICD-10-CM

## 2023-01-06 DIAGNOSIS — H93.8X1 EAR CANAL MASS, RIGHT: ICD-10-CM

## 2023-01-06 DIAGNOSIS — E66.9 OBESITY (BMI 35.0-39.9 WITHOUT COMORBIDITY): ICD-10-CM

## 2023-01-06 DIAGNOSIS — Z00.00 ANNUAL PHYSICAL EXAM: Primary | ICD-10-CM

## 2023-01-06 RX ORDER — ALBUTEROL SULFATE 90 UG/1
AEROSOL, METERED RESPIRATORY (INHALATION)
COMMUNITY
Start: 2022-12-19 | End: 2023-01-06

## 2023-01-06 NOTE — ASSESSMENT & PLAN NOTE
BMI Counseling: Body mass index is 35 09 kg/m²  The BMI is above normal  Nutrition recommendations include reducing portion sizes, decreasing overall calorie intake and 3-5 servings of fruits/vegetables daily  Exercise recommendations include vigorous aerobic physical activity for 75 minutes/week

## 2023-01-06 NOTE — PROGRESS NOTES
[facility]    NAME: Saniya Reason  AGE: 49 y o  SEX: male  : 1975     DATE: 2023     Assessment and Plan:     Problem List Items Addressed This Visit        Other    Annual physical exam - Primary    Obesity (BMI 35 0-39 9 without comorbidity)     BMI Counseling: Body mass index is 35 09 kg/m²  The BMI is above normal  Nutrition recommendations include reducing portion sizes, decreasing overall calorie intake and 3-5 servings of fruits/vegetables daily  Exercise recommendations include vigorous aerobic physical activity for 75 minutes/week  Malignant melanoma of face ([facility] [address] )     Advised to continue follow up with dermatology         Other Visit Diagnoses     Ear canal mass, right        Relevant Orders    Ambulatory Referral to Otolaryngology          Immunizations and preventive care screenings were discussed with patient today  Appropriate education was printed on patient's after visit summary  Counseling:  Alcohol/drug use: discussed moderation in alcohol intake, the recommendations for healthy alcohol use, and avoidance of illicit drug use  Dental Health: discussed importance of regular tooth brushing, flossing, and dental visits  Injury prevention: discussed safety/seat belts, safety helmets, smoke detectors, carbon dioxide detectors, and smoking near bedding or upholstery  Sexual health: discussed sexually transmitted diseases, partner selection, use of condoms, avoidance of unintended pregnancy, and contraceptive alternatives  · Exercise: the importance of regular exercise/physical activity was discussed  Recommend exercise 3-5 times per week for at least 30 minutes            Return in about 1 year (around 2024) for Annual physical      Chief Complaint:     Chief Complaint   Patient presents with   • Physical Exam      History of Present Illness:     Adult Annual Physical   Patient here for a comprehensive physical exam      Diet and Physical Activity  · Diet/Nutrition: well balanced diet  · Exercise: moderate cardiovascular exercise  Depression Screening  PHQ-2/9 Depression Screening    Little interest or pleasure in doing things: 0 - not at all  Feeling down, depressed, or hopeless: 0 - not at all  PHQ-2 Score: 0  PHQ-2 Interpretation: Negative depression screen          Review of Systems:     Review of Systems   Constitutional: Negative for chills and fever  HENT: Negative for congestion, postnasal drip, rhinorrhea and sinus pain  Eyes: Negative for photophobia and visual disturbance  Respiratory: Negative for cough and shortness of breath  Cardiovascular: Negative for chest pain, palpitations and leg swelling  Gastrointestinal: Negative for abdominal pain, constipation, diarrhea, nausea and vomiting  Genitourinary: Negative for difficulty urinating and dysuria  Musculoskeletal: Negative for arthralgias and myalgias  Skin: Negative for rash  Neurological: Negative for dizziness and syncope        Past Medical History:     Past Medical History:   Diagnosis Date   • Allergic 1975   • Known health problems: none    • Patient denies medical problems 03/04/2020   • Patient denies medical problems 03/23/2020      Past Surgical History:     Past Surgical History:   Procedure Laterality Date   • COLONOSCOPY     • EGD     • TX LAPAROSCOPIC APPENDECTOMY N/A 7/28/2020    Procedure: APPENDECTOMY LAPAROSCOPIC;  Surgeon: Mercy Cr MD;  Location:  MAIN OR;  Service: General   • TX RPR UMBILICAL HRNA 5 YRS/> REDUCIBLE N/A 7/28/2020    Procedure: REPAIR HERNIA UMBILICAL;  Surgeon: Mercy Cr MD;  Location:  MAIN OR;  Service: General   • WISDOM TOOTH EXTRACTION        Family History:     Family History   Problem Relation Age of Onset   • Stroke Mother    • Diabetes Mother    • Diabetes Father    • Hearing loss Father    • Vision loss Father    • Glaucoma Father • No Known Problems Sister    • Colorectal Cancer Neg Hx    • Colon polyps Neg Hx       Social History:     Social History     Socioeconomic History   • Marital status:      Spouse name: None   • Number of children: None   • Years of education: None   • Highest education level: None   Occupational History   • None   Tobacco Use   • Smoking status: Never   • Smokeless tobacco: Never   Vaping Use   • Vaping Use: Never used   Substance and Sexual Activity   • Alcohol use: Yes     Alcohol/week: 3 0 standard drinks     Types: 1 Glasses of wine, 2 Cans of beer per week     Comment: beer or scotch   • Drug use: Never     Comment: Denies use   • Sexual activity: Yes     Partners: Female     Birth control/protection: Male Sterilization   Other Topics Concern   • None   Social History Narrative   • None     Social Determinants of Health     Financial Resource Strain: Not on file   Food Insecurity: Not on file   Transportation Needs: Not on file   Physical Activity: Not on file   Stress: Not on file   Social Connections: Not on file   Intimate Partner Violence: Not At Risk   • Fear of Current or Ex-Partner: No   • Emotionally Abused: No   • Physically Abused: No   • Sexually Abused: No   Housing Stability: Not on file      Current Medications:     No current outpatient medications on file  No current facility-administered medications for this visit  Allergies:     No Known Allergies   Physical Exam:     /82   Pulse 93   Temp 97 8 °F (36 6 °C)   Resp 14   Ht 6' 1" (1 854 m)   Wt 121 kg (266 lb)   SpO2 97%   BMI 35 09 kg/m²     Physical Exam  Constitutional:       General: He is not in acute distress  Appearance: Normal appearance  He is not ill-appearing, toxic-appearing or diaphoretic  HENT:      Head: Normocephalic and atraumatic        Right Ear: Tympanic membrane and ear canal normal       Left Ear: Tympanic membrane and ear canal normal       Ears:        Comments: Small soft tissue mass noted      Nose: Nose normal  No congestion  Mouth/Throat:      Mouth: Mucous membranes are moist       Pharynx: Oropharynx is clear  No oropharyngeal exudate  Eyes:      Extraocular Movements: Extraocular movements intact  Conjunctiva/sclera: Conjunctivae normal       Pupils: Pupils are equal, round, and reactive to light  Cardiovascular:      Rate and Rhythm: Normal rate and regular rhythm  Pulses: Normal pulses  Heart sounds: No murmur heard  Pulmonary:      Effort: Pulmonary effort is normal       Breath sounds: Normal breath sounds  No wheezing, rhonchi or rales  Abdominal:      General: Bowel sounds are normal  There is no distension  Palpations: Abdomen is soft  Tenderness: There is no abdominal tenderness  Musculoskeletal:         General: No swelling or tenderness  Normal range of motion  Cervical back: Normal range of motion and neck supple  Skin:     General: Skin is warm and dry  Capillary Refill: Capillary refill takes less than 2 seconds  Neurological:      General: No focal deficit present  Mental Status: He is alert and oriented to person, place, and time  Cranial Nerves: No cranial nerve deficit  Psychiatric:         Mood and Affect: Mood normal          Behavior: Behavior normal          Thought Content:  Thought content normal           Petrona Ross DO  [address]

## 2023-01-06 NOTE — PATIENT INSTRUCTIONS

## 2023-01-20 ENCOUNTER — OFFICE VISIT (OUTPATIENT)
Dept: URGENT CARE | Facility: CLINIC | Age: 48
End: 2023-01-20

## 2023-01-20 VITALS
DIASTOLIC BLOOD PRESSURE: 72 MMHG | SYSTOLIC BLOOD PRESSURE: 118 MMHG | OXYGEN SATURATION: 99 % | HEART RATE: 66 BPM | RESPIRATION RATE: 18 BRPM | TEMPERATURE: 97.3 F

## 2023-01-20 DIAGNOSIS — R35.0 URINARY FREQUENCY: Primary | ICD-10-CM

## 2023-01-20 LAB
SL AMB  POCT GLUCOSE, UA: NEGATIVE
SL AMB LEUKOCYTE ESTERASE,UA: NEGATIVE
SL AMB POCT BILIRUBIN,UA: NEGATIVE
SL AMB POCT BLOOD,UA: NEGATIVE
SL AMB POCT CLARITY,UA: CLEAR
SL AMB POCT COLOR,UA: YELLOW
SL AMB POCT KETONES,UA: NEGATIVE
SL AMB POCT NITRITE,UA: NEGATIVE
SL AMB POCT PH,UA: 6.5
SL AMB POCT SPECIFIC GRAVITY,UA: 1.01
SL AMB POCT URINE PROTEIN: NEGATIVE
SL AMB POCT UROBILINOGEN: 0.2

## 2023-01-20 RX ORDER — CEPHALEXIN 500 MG/1
500 CAPSULE ORAL EVERY 12 HOURS SCHEDULED
Qty: 14 CAPSULE | Refills: 0 | Status: SHIPPED | OUTPATIENT
Start: 2023-01-20 | End: 2023-01-27

## 2023-01-20 NOTE — PATIENT INSTRUCTIONS
Urinary Urgency and Frequency   AMBULATORY CARE:   Urinary urgency and frequency  is a condition that increases how strongly or how often you need to urinate  The condition may also be called urgency-frequency syndrome  Urinary urgency means you feel such a strong need to urinate that you have trouble waiting  You may also feel discomfort in your bladder  Urinary frequency means you need to urinate many times during the day  This may also be called increased daytime frequency  You may be woken from sleep by the need to urinate  Urgency and frequency often happen together, but you may only have one  Contact your healthcare provider if:   Your urine is pink, or you notice blood in your urine  You have pain with urination  You continue to have symptoms even after you take your medicine  You have new or worsening symptoms  You have questions or concerns about your condition or care  Treatment  will depend on the type and cause of your urination problems  You may need any of the following:  Medicines  may be given to relax your bladder and decrease urination  You may also need antibiotics if your symptoms are caused by a bacterial infection  Sacral nerve stimulation  sends electrical signals to your sacral nerve through a small device implanted under your skin  Your sacral nerve controls your bladder, sphincter, and pelvic floor muscles  Botox injections  into your bladder may help relax your bladder muscle to decrease urgency and frequency  Surgery  may be done if all other treatments cannot help you control your bladder  Manage urinary urgency and frequency:   Keep a record of your urination patterns for a few days  Write down the number of times you urinate over 24 hours, the amount, and if you have urine leakage  Record how strong the urge to urinate was each time  Your healthcare provider may also want you to record the type and amount of liquids you drink       Train your bladder  Go to the bathroom at set times, such as every 2 hours, even if you do not feel the urge to go  You can also try to hold your urine when you feel the urge to go  For example, hold your urine for 5 minutes when you feel the urge to go  As that becomes easier, hold your urine for 10 minutes  Work up to every 3 or 4 hours to help control your bladder  Limit liquids as directed  Limit liquids to decrease the amount you urinate  Ask how much liquid to drink each day and which liquids are best for you  You may need to avoid drinking liquids several hours before you go to sleep  Your healthcare provider may also recommend that you limit caffeine and alcohol  Do Kegel exercises often  Kegel exercises help strengthen your pelvic muscles and improve bladder control  These muscles help you stop urinating  Squeeze these muscles tightly for 5 seconds like you are trying to stop the flow of urine  Then relax for 5 seconds  Gradually work up to squeezing for 10 seconds  Do 3 sets of 15 repetitions a day, or as directed  Exercise regularly and maintain a healthy weight  Ask your healthcare provider how much you should weigh and about the best exercise plan for you  Extra weight puts pressure on your bladder and may make your symptoms worse  Ask your provider to help you create a safe weight loss plan if you are overweight  Follow up with your healthcare provider as directed: Your healthcare provider may refer you to a specialist to find the cause of your symptoms  Write down your questions so you remember to ask them during your visits  © Copyright [employer] 2022 Information is for End User's use only and may not be sold, redistributed or otherwise used for commercial purposes  All illustrations and images included in CareNotes® are the copyrighted property of [employer]  or [facility] [place] [employer]   The above information is an  only   It is not intended as medical advice for individual conditions or treatments  Talk to your doctor, nurse or pharmacist before following any medical regimen to see if it is safe and effective for you

## 2023-01-20 NOTE — PROGRESS NOTES
[facility]        NAME: Nabila Cruz is a 49 y o  male  : 1975    MRN: [id number]  DATE: 2023  TIME: 5:04 PM    Assessment and Plan   Urinary frequency [R35 0]  1  Urinary frequency  POCT urine dip auto non-scope    cephalexin (KEFLEX) 500 mg capsule    Urine culture            Patient Instructions     Discussed with patient that we will send a urine culture due to negative urine dip at urgent care today  Patient was given a paper prescription and advised to watch if symptoms continue to improve he does not need to fill the prescription while we wait for urine culture but if he develops any worsening symptoms he can fill prescription and start antibiotics at that time  Patient was agreeable to this  Follow up with PCP in 3-5 days  Proceed to  ER if symptoms worsen  Chief Complaint     Chief Complaint   Patient presents with   • Possible UTI     Began one week ago  Urinary frequency  Denies any other symptoms at this time  History of Present Illness       50-year-old male presents to the clinic with urinary frequency and urgency that started 1 week ago  Patient denies any dysuria, hematuria, abdominal pain, abdominal pressure, flank pain, back pain, fevers, chills, nausea, vomiting, diarrhea  Patient states that he started drinking cranberry juice and has noticed that symptoms have improved but states that he was going to the bathroom at least 10+ times a day  Patient denies any malodorous urine or cloudy urine  Review of Systems   Review of Systems   Constitutional: Negative for chills and fever  Gastrointestinal: Negative for abdominal pain, diarrhea, nausea and vomiting  Genitourinary: Positive for frequency and urgency  Negative for dysuria, flank pain and hematuria  Musculoskeletal: Negative for back pain and myalgias  Neurological: Negative for dizziness, light-headedness and headaches           Current Medications       Current Outpatient Medications:   •  cephalexin (KEFLEX) 500 mg capsule, Take 1 capsule (500 mg total) by mouth every 12 (twelve) hours for 7 days, Disp: 14 capsule, Rfl: 0    Current Allergies     Allergies as of 01/20/2023   • (No Known Allergies)            The following portions of the patient's history were reviewed and updated as appropriate: allergies, current medications, past family history, past medical history, past social history, past surgical history and problem list      Past Medical History:   Diagnosis Date   • Allergic 1975   • Known health problems: none    • Patient denies medical problems 03/04/2020   • Patient denies medical problems 03/23/2020       Past Surgical History:   Procedure Laterality Date   • COLONOSCOPY     • EGD     • [place] LAPAROSCOPIC APPENDECTOMY N/A 7/28/2020    Procedure: APPENDECTOMY LAPAROSCOPIC;  Surgeon: Jethro Lopez MD;  Location:  MAIN OR;  Service: General   • AL RPR UMBILICAL HRNA 5 YRS/> REDUCIBLE N/A 7/28/2020    Procedure: REPAIR HERNIA UMBILICAL;  Surgeon: Jethro Lopez MD;  Location:  MAIN OR;  Service: General   • WISDOM TOOTH EXTRACTION         Family History   Problem Relation Age of Onset   • Stroke Mother    • Diabetes Mother    • Diabetes Father    • Hearing loss Father    • Vision loss Father    • Glaucoma Father    • No Known Problems Sister    • Colorectal Cancer Neg Hx    • Colon polyps Neg Hx          Medications have been verified  Objective   /72   Pulse 66   Temp (!) 97 3 °F (36 3 °C)   Resp 18   SpO2 99%   No LMP for male patient  Physical Exam     Physical Exam  Vitals and nursing note reviewed  Constitutional:       General: He is not in acute distress  Appearance: He is well-developed  He is not diaphoretic  Pulmonary:      Effort: Pulmonary effort is normal    Abdominal:      General: Bowel sounds are normal       Palpations: Abdomen is soft  Tenderness: There is no abdominal tenderness   There is no right CVA tenderness, left CVA tenderness or guarding  Skin:     General: Skin is warm and dry  Neurological:      Mental Status: He is alert and oriented to person, place, and time

## 2023-01-25 ENCOUNTER — OFFICE VISIT (OUTPATIENT)
Dept: FAMILY MEDICINE CLINIC | Facility: CLINIC | Age: 48
End: 2023-01-25

## 2023-01-25 VITALS
HEIGHT: 73 IN | BODY MASS INDEX: 35.09 KG/M2 | RESPIRATION RATE: 17 BRPM | TEMPERATURE: 97.5 F | HEART RATE: 64 BPM | DIASTOLIC BLOOD PRESSURE: 82 MMHG | WEIGHT: 264.8 LBS | OXYGEN SATURATION: 98 % | SYSTOLIC BLOOD PRESSURE: 130 MMHG

## 2023-01-25 DIAGNOSIS — R17 ICTERUS: Primary | ICD-10-CM

## 2023-01-25 DIAGNOSIS — R39.198 DECREASED URINE STREAM: ICD-10-CM

## 2023-01-25 DIAGNOSIS — Z12.5 SCREENING FOR PROSTATE CANCER: ICD-10-CM

## 2023-01-25 LAB
BACTERIA UR CULT: NORMAL
Lab: NO GROWTH

## 2023-01-28 LAB
ALBUMIN SERPL-MCNC: 4.3 G/DL (ref 4–5)
ALBUMIN/GLOB SERPL: 1.7 {RATIO} (ref 1.2–2.2)
ALP SERPL-CCNC: 56 IU/L (ref 44–121)
ALT SERPL-CCNC: 33 IU/L (ref 0–44)
AST SERPL-CCNC: 20 IU/L (ref 0–40)
BILIRUB SERPL-MCNC: 0.4 MG/DL (ref 0–1.2)
BUN SERPL-MCNC: 14 MG/DL (ref 6–24)
BUN/CREAT SERPL: 12 (ref 9–20)
CALCIUM SERPL-MCNC: 9.1 MG/DL (ref 8.7–10.2)
CHLORIDE SERPL-SCNC: 102 MMOL/L (ref 96–106)
CO2 SERPL-SCNC: 23 MMOL/L (ref 20–29)
CREAT SERPL-MCNC: 1.18 MG/DL (ref 0.76–1.27)
EGFR: 76 ML/MIN/1.73
GLOBULIN SER-MCNC: 2.5 G/DL (ref 1.5–4.5)
GLUCOSE SERPL-MCNC: 91 MG/DL (ref 70–99)
POTASSIUM SERPL-SCNC: 4.4 MMOL/L (ref 3.5–5.2)
PROT SERPL-MCNC: 6.8 G/DL (ref 6–8.5)
PSA SERPL-MCNC: 1 NG/ML (ref 0–4)
SODIUM SERPL-SCNC: 138 MMOL/L (ref 134–144)

## 2023-03-19 ENCOUNTER — OFFICE VISIT (OUTPATIENT)
Dept: URGENT CARE | Facility: CLINIC | Age: 48
End: 2023-03-19

## 2023-03-19 VITALS
HEART RATE: 61 BPM | TEMPERATURE: 97.9 F | OXYGEN SATURATION: 97 % | DIASTOLIC BLOOD PRESSURE: 74 MMHG | SYSTOLIC BLOOD PRESSURE: 118 MMHG

## 2023-03-19 DIAGNOSIS — M62.838 MUSCLE SPASM: Primary | ICD-10-CM

## 2023-03-19 DIAGNOSIS — F41.9 ANXIETY: ICD-10-CM

## 2023-03-19 RX ORDER — HYDROXYZINE HYDROCHLORIDE 25 MG/1
25 TABLET, FILM COATED ORAL EVERY 6 HOURS PRN
Qty: 30 TABLET | Refills: 0 | Status: SHIPPED | OUTPATIENT
Start: 2023-03-19

## 2023-03-19 RX ORDER — CYCLOBENZAPRINE HCL 10 MG
10 TABLET ORAL 3 TIMES DAILY PRN
Qty: 20 TABLET | Refills: 0 | Status: SHIPPED | OUTPATIENT
Start: 2023-03-19

## 2023-03-19 NOTE — PATIENT INSTRUCTIONS
Anxiety   WHAT YOU NEED TO KNOW:   Anxiety is a condition that causes you to feel extremely worried or nervous  The feelings are so strong that they can cause problems with your daily activities or sleep  Anxiety may be triggered by something you fear, or it may happen without a cause  Family or work stress, smoking, caffeine, and alcohol can increase your risk for anxiety  Certain medicines or health conditions can also increase your risk  Anxiety can become a long-term condition if it is not managed or treated  DISCHARGE INSTRUCTIONS:   Call your local emergency number (911 in the [address] [facility]) if:   You have chest pain, tightness, or heaviness that may spread to your shoulders, arms, jaw, neck, or back  You feel like hurting yourself or someone else  Call your doctor if:   Your symptoms get worse or do not get better with treatment  Your anxiety keeps you from doing your regular daily activities  You have new symptoms since your last visit  You have questions or concerns about your condition or care  Medicines:   Medicines  may be given to help you feel more calm and relaxed, and decrease your symptoms  Take your medicine as directed  Contact your healthcare provider if you think your medicine is not helping or if you have side effects  Tell your provider if you are allergic to any medicine  Keep a list of the medicines, vitamins, and herbs you take  Include the amounts, and when and why you take them  Bring the list or the pill bottles to follow-up visits  Carry your medicine list with you in case of an emergency  Manage anxiety:   Talk to someone about your anxiety  Your healthcare provider may suggest counseling  Cognitive behavioral therapy can help you understand and change how you react to events that trigger your symptoms  You might feel more comfortable talking with a friend or family member about your anxiety  Choose someone you know will be supportive and encouraging      Find ways to relax  Activities such as exercise, meditation, or listening to music can help you relax  Spend time with friends, or do things you enjoy  Practice deep breathing  Deep breathing can help you relax when you feel anxious  Focus on taking slow, deep breaths several times a day, or during an anxiety attack  Breathe in through your nose and out through your mouth  Create a regular sleep routine  Regular sleep can help you feel calmer during the day  Go to sleep and wake up at the same times every day  Do not watch television or use the computer right before bed  Your room should be comfortable, dark, and quiet  Eat a variety of healthy foods  Healthy foods include fruits, vegetables, low-fat dairy products, lean meats, fish, whole-grain breads, and cooked beans  Healthy foods can help you feel less anxious and have more energy  Exercise regularly  Exercise can increase your energy level  Exercise may also lift your mood and help you sleep better  Your healthcare provider can help you create an exercise plan  Do not smoke  Nicotine and other chemicals in cigarettes and cigars can increase anxiety  Ask your healthcare provider for information if you currently smoke and need help to quit  E-cigarettes or smokeless tobacco still contain nicotine  Talk to your healthcare provider before you use these products  Do not have caffeine  Caffeine can make your symptoms worse  Do not have foods or drinks that are meant to increase your energy level  Limit or do not drink alcohol  Ask your healthcare provider if alcohol is safe for you  You may not be able to drink alcohol if you take certain anxiety or depression medicines  Limit alcohol to 1 drink per day if you are a woman  Limit alcohol to 2 drinks per day if you are a man  A drink of alcohol is 12 ounces of beer, 5 ounces of wine, or 1½ ounces of liquor  Do not use drugs  Drugs can make your anxiety worse   It can also make anxiety hard to manage  Talk to your healthcare provider if you use drugs and want help to quit  Follow up with your doctor within 2 weeks or as directed:  Write down your questions so you remember to ask them during your visits  © Copyright [facility] 2022 Information is for End User's use only and may not be sold, redistributed or otherwise used for commercial purposes  The above information is an  only  It is not intended as medical advice for individual conditions or treatments  Talk to your doctor, nurse or pharmacist before following any medical regimen to see if it is safe and effective for you  Tension Headache   WHAT YOU NEED TO KNOW:   Tension headaches are most often caused by stress, eye strain, or muscle tightness  The pain of a tension headache may start in the forehead or the back of the head  The pain often spreads over the whole head and down into the neck and shoulders  Over-the-counter pain medicine is the most useful and common treatment for a tension headache  Exercise, biofeedback, meditation, or relaxation techniques may also decrease your headache pain  DISCHARGE INSTRUCTIONS:   Call your local emergency number (911 in the [address] [place] [address],[address]) if:   You have difficulty seeing, speaking, or moving  You have a seizure  Call your doctor if:   You pass out or become confused  You have a sudden headache that seems different or much worse than your usual headaches  You have a headache, fever, and a stiff neck  Your headaches continue to get worse  Your headaches happen so often that they affect your ability to do your work or normal activities  You need to take medicine to help your headaches more often than your healthcare provider says you should  Your headaches get so bad that they cause you to vomit  You have questions or concerns about your condition or care  Medicines:   NSAIDs , such as ibuprofen, help decrease swelling, pain, and fever   This medicine is available with or without a doctor's order  NSAIDs can cause stomach bleeding or kidney problems in certain people  If you take blood thinner medicine, always ask your healthcare provider if NSAIDs are safe for you  Always read the medicine label and follow directions  Acetaminophen  decreases pain and fever  It is available without a doctor's order  Ask how much to take and how often to take it  Follow directions  Read the labels of all other medicines you are using to see if they also contain acetaminophen, or ask your doctor or pharmacist  Acetaminophen can cause liver damage if not taken correctly  Take your medicine as directed  Contact your healthcare provider if you think your medicine is not helping or if you have side effects  Tell your provider if you are allergic to any medicine  Keep a list of the medicines, vitamins, and herbs you take  Include the amounts, and when and why you take them  Bring the list or the pill bottles to follow-up visits  Carry your medicine list with you in case of an emergency  Manage your symptoms:   Keep a headache record  Include when the headaches start and stop and what made them better  Describe your symptoms, such as how the pain feels, where it is, and how bad it is  Record anything you ate or drank for the past 24 hours before your headache  Bring this to follow-up visits  Apply heat as directed  Heat may help decrease headache pain and muscle spasms  Apply heat on the area for 20 to 30 minutes every 2 hours for as many days as directed  A warm bath may also help relieve muscle tension and spasms  Apply ice as directed  Ice may help decrease headache pain  Use an ice pack or put crushed ice in a plastic bag  Cover it with a towel and place it on the area for 15 to 20 minutes every hour as directed  Ask about spinal manipulation  This treatment combines movement, massage, exercise, and physical therapy   It can help relieve a tension headache, and may be able to prevent another if done regularly  Talk to your healthcare provider to make sure it is safe for you  To prevent neck injuries, spinal manipulation should only be done by trained providers  Prevent tension headaches:   Avoid muscle tension  Do not stay in one position for long periods of time  Use a different pillow if you wake up with sore neck and shoulder muscles  Find ways to relax your muscles, such as massage or resting in a quiet, dark room  Avoid eye strain  Make sure you have good lighting when you read, sew, or do similar activities  Get yearly eye exams and wear glasses as directed  Get enough sleep  Get 8 to 10 hours of sleep each night  Create a sleep schedule  Go to bed and wake up at the same times each day  It may be helpful to do something relaxing before bed  Do not watch television right before bed  Eat a variety of healthy foods  Healthy foods include fruits, vegetables, whole-grain breads, low-fat dairy products, beans, lean meats, and fish  Do not eat foods that trigger your headaches  Exercise regularly  Exercise helps decrease stress and headaches  Ask about the best exercise plan for you  Drink liquids as directed  You may need to drink more liquid to prevent dehydration  Dehydration can make a tension headache worse  Ask your healthcare provider how much liquid to drink and which liquids are best for you  Limit caffeine as directed  Caffeine may make a tension headache worse  Do not drink alcohol  Alcohol can trigger a headache  It can also prevent medicines from stopping your headache  Do not smoke  Nicotine and other chemicals in cigarettes and cigars can trigger a headache and also cause lung damage  Ask your healthcare provider for information if you currently smoke and need help to quit  E-cigarettes or smokeless tobacco still contain nicotine  Talk to your healthcare provider before you use these products      Follow up with your healthcare provider as directed:  Bring the headache record with you  Write down your questions so you remember to ask them during your visits  © Copyright [employer] 2022 Information is for End User's use only and may not be sold, redistributed or otherwise used for commercial purposes  The above information is an  only  It is not intended as medical advice for individual conditions or treatments  Talk to your doctor, nurse or pharmacist before following any medical regimen to see if it is safe and effective for you

## 2023-03-19 NOTE — PROGRESS NOTES
[facility]        NAME: Kimmy Gonzalez is a 51 y o  male  : 1975    MRN: [id number]  DATE: 2023  TIME: 1:37 PM    Assessment and Plan   Muscle spasm [M62 838]  1  Muscle spasm  cyclobenzaprine (FLEXERIL) 10 mg tablet      2  Anxiety  hydrOXYzine HCL (ATARAX) 25 mg tablet        Start flexeril for muscle tightness - aware may cause drowsiness  Will not prescribe steroids due to not wanting to increase anxiety - recommend otc aleve bid  Trial atarax as needed for anxiety -   Has f/u with pcp on  [address] [facility] Or local ER if needed with thoughts of SI/HI    Patient Instructions     Follow up with PCP in 3-5 days  Proceed to  ER if symptoms worsen  Chief Complaint     Chief Complaint   Patient presents with   • Neck Pain     Pt reports that neck pain (back of neck, right side) started on Monday and has worsened over past week  • Anxiety     Pt reports anxiety started over past year and has increased in severity -affecting sleep and neck pain  History of Present Illness   Kimmy Gnozalez presents to the clinic c/o    Neck Pain (Pt reports that neck pain (back of neck, right side) started on Monday and has worsened over past week )  Pt states that has neck tightness and pain on and off but usually only present for a few days  Has used biofreeze, theracaine hook, stretches, advil with no relief  Noted today when flexes neck notes increase in anxiety  Neck pain starting to go into right shoulder and causing tingling sensation in the right arm at times  Anxiety (Pt reports anxiety started over past year and has increased in severity -affecting sleep and neck pain  )  Usually goes a few days without sleep and then sleeps for a few days  Has taken benadryl for a few months, then tried melatonin, and nyqul pure zs to help  The medications have not been helping much anymore    Seems to fall asleep and then wakes up suddenly in a panic  Needs to get up, walk around, and get a drink of water to help calm back down  Has an appt on tues with his PCP  Not sure what to do anymore  Denies HI/SI  Review of Systems   Review of Systems   All other systems reviewed and are negative  Current Medications     Long-Term Medications   Medication Sig Dispense Refill   • cyclobenzaprine (FLEXERIL) 10 mg tablet Take 1 tablet (10 mg total) by mouth 3 (three) times a day as needed for muscle spasms 20 tablet 0   • hydrOXYzine HCL (ATARAX) 25 mg tablet Take 1 tablet (25 mg total) by mouth every 6 (six) hours as needed for anxiety 30 tablet 0       Current Allergies     Allergies as of 03/19/2023   • (No Known Allergies)            The following portions of the patient's history were reviewed and updated as appropriate: allergies, current medications, past family history, past medical history, past social history, past surgical history and problem list     Objective   /74 (BP Location: Left arm, Patient Position: Sitting, Cuff Size: Large)   Pulse 61   Temp 97 9 °F (36 6 °C) (Temporal)   SpO2 97%        Physical Exam     Physical Exam  Vitals and nursing note reviewed  Constitutional:       Appearance: Normal appearance  He is well-developed  HENT:      Head: Normocephalic and atraumatic  Eyes:      General: Lids are normal       Conjunctiva/sclera: Conjunctivae normal       Pupils: Pupils are equal, round, and reactive to light  Cardiovascular:      Rate and Rhythm: Normal rate and regular rhythm  Heart sounds: Normal heart sounds, S1 normal and S2 normal    Pulmonary:      Effort: Pulmonary effort is normal       Breath sounds: Normal breath sounds  Musculoskeletal:      Cervical back: Normal range of motion and neck supple  Spasms and tenderness present  No swelling, edema, deformity, erythema, signs of trauma, lacerations, rigidity, torticollis, bony tenderness or crepitus  Pain with movement present   Normal range of motion  Back:    Skin:     General: Skin is warm and dry  Neurological:      Mental Status: He is alert  Psychiatric:         Speech: Speech normal          Behavior: Behavior normal          Thought Content:  Thought content normal          Judgment: Judgment normal

## 2023-03-21 ENCOUNTER — OFFICE VISIT (OUTPATIENT)
Dept: FAMILY MEDICINE CLINIC | Facility: CLINIC | Age: 48
End: 2023-03-21

## 2023-03-21 VITALS
HEART RATE: 78 BPM | OXYGEN SATURATION: 98 % | HEIGHT: 73 IN | BODY MASS INDEX: 35.02 KG/M2 | WEIGHT: 264.2 LBS | DIASTOLIC BLOOD PRESSURE: 86 MMHG | TEMPERATURE: 97.9 F | SYSTOLIC BLOOD PRESSURE: 122 MMHG | RESPIRATION RATE: 14 BRPM

## 2023-03-21 DIAGNOSIS — G47.33 OSA (OBSTRUCTIVE SLEEP APNEA): ICD-10-CM

## 2023-03-21 DIAGNOSIS — M25.50 ARTHRALGIA, UNSPECIFIED JOINT: ICD-10-CM

## 2023-03-21 DIAGNOSIS — F41.9 SEVERE ANXIETY: Primary | ICD-10-CM

## 2023-03-21 RX ORDER — SERTRALINE HYDROCHLORIDE 25 MG/1
25 TABLET, FILM COATED ORAL DAILY
Qty: 30 TABLET | Refills: 5 | Status: SHIPPED | OUTPATIENT
Start: 2023-03-21 | End: 2023-09-17

## 2023-03-21 NOTE — PROGRESS NOTES
Name: Sheryle Foster      : 1975      MRN: [id number]  Encounter Provider: Dinesh Correa PA-C  Encounter Date: 3/21/2023   Encounter department: [facility]    Assessment & Plan     1  Severe anxiety  -     sertraline (ZOLOFT) 25 mg tablet; Take 1 tablet (25 mg total) by mouth daily    2  JOSE MANUEL (obstructive sleep apnea)  -     Ambulatory Referral to Sleep Medicine; Future    3  Arthralgia, unspecified joint  -     ANEUDY Screen w/ Reflex to Titer/Pattern; Future  -     RF Screen w/ Reflex to Titer; Future      ARIANA-7 Flowsheet Screening    Flowsheet Row Most Recent Value   Over the last 2 weeks, how often have you been bothered by any of the following problems? Feeling nervous, anxious, or on edge 3   Not being able to stop or control worrying 3   Worrying too much about different things 3   Trouble relaxing 3   Being so restless that it is hard to sit still 3   Becoming easily annoyed or irritable 3   Feeling afraid as if something awful might happen 2   ARIANA-7 Total Score 20        Patient will follow-up in 1 month or sooner if needed  Reevaluation for anxiety symptoms and adjust medication if necessary  Patient instructed call with any questions or concerns       Subjective      HPI   67-year-old male here today for evaluation of anxiety  Patient states since January he has been having issues with worsening anxiety and panic attacks  Patient thinks possibly symptoms driven by his stressful occupation  Patient denies any suicidal ideation or thoughts of hurting others  See anxiety screening above  Wife/patient also reports JOSE MANUEL symptoms  Positive loud snoring and daytime drowsiness  Would like to be evaluated for sleep apnea  Patient also with concerns of frequent joint pain would like to rule out autoimmune disease  Review of Systems  Positive anxiety  Frequent joint pain     Denies fevers, chills, headaches, dizziness, chest pain, shortness of breath, abdominal pain or GI symptoms  All other systems negative  Current Outpatient Medications on File Prior to Visit   Medication Sig   • cyclobenzaprine (FLEXERIL) 10 mg tablet Take 1 tablet (10 mg total) by mouth 3 (three) times a day as needed for muscle spasms   • hydrOXYzine HCL (ATARAX) 25 mg tablet Take 1 tablet (25 mg total) by mouth every 6 (six) hours as needed for anxiety       Objective     /86 (BP Location: Left arm, Patient Position: Sitting, Cuff Size: Large)   Pulse 78   Temp 97 9 °F (36 6 °C)   Resp 14   Ht 6' 1" (1 854 m)   Wt 120 kg (264 lb 3 2 oz)   SpO2 98%   BMI 34 86 kg/m²     Physical Exam  Vitals and nursing note reviewed  Constitutional:       General: He is not in acute distress  Appearance: He is not ill-appearing, toxic-appearing or diaphoretic  HENT:      Head: Normocephalic  Eyes:      General: No scleral icterus  Conjunctiva/sclera: Conjunctivae normal    Cardiovascular:      Rate and Rhythm: Normal rate and regular rhythm  Pulmonary:      Effort: Pulmonary effort is normal       Breath sounds: Normal breath sounds  Skin:     General: Skin is warm  Neurological:      General: No focal deficit present  Mental Status: He is alert and oriented to person, place, and time  Psychiatric:         Attention and Perception: Attention normal  He does not perceive auditory or visual hallucinations  Mood and Affect: Mood is anxious  Mood is not depressed or elated  Affect is not labile, blunt, flat, angry, tearful or inappropriate  Speech: Speech normal          Behavior: Behavior is cooperative  Thought Content: Thought content is not paranoid or delusional  Thought content does not include homicidal or suicidal ideation  Thought content does not include homicidal or suicidal plan           Cognition and Memory: Cognition and memory normal          Judgment: Judgment normal        Sahra Morales PA-C

## 2023-03-22 LAB
ANA TITR SER IF: NEGATIVE {TITER}
RHEUMATOID FACT SERPL-ACNC: 10.9 IU/ML

## 2023-04-21 PROBLEM — M35.3 POLYMYALGIA (HCC): Status: ACTIVE | Noted: 2023-04-21

## 2023-04-21 PROBLEM — F41.9 SEVERE ANXIETY: Status: ACTIVE | Noted: 2023-04-21

## 2023-04-21 PROBLEM — R23.2 FACIAL FLUSHING: Status: ACTIVE | Noted: 2023-04-21

## 2023-04-21 PROBLEM — R53.82 CHRONIC FATIGUE: Status: ACTIVE | Noted: 2023-04-21

## 2023-04-24 ENCOUNTER — OFFICE VISIT (OUTPATIENT)
Dept: PULMONOLOGY | Facility: HOSPITAL | Age: 48
End: 2023-04-24

## 2023-04-24 VITALS
WEIGHT: 265.8 LBS | SYSTOLIC BLOOD PRESSURE: 128 MMHG | BODY MASS INDEX: 35.23 KG/M2 | HEART RATE: 66 BPM | OXYGEN SATURATION: 97 % | HEIGHT: 73 IN | DIASTOLIC BLOOD PRESSURE: 82 MMHG

## 2023-04-24 DIAGNOSIS — E66.9 OBESITY (BMI 35.0-39.9 WITHOUT COMORBIDITY): ICD-10-CM

## 2023-04-24 DIAGNOSIS — R06.83 SNORING: ICD-10-CM

## 2023-04-24 DIAGNOSIS — G47.19 EXCESSIVE DAYTIME SLEEPINESS: ICD-10-CM

## 2023-04-24 DIAGNOSIS — G47.33 OSA (OBSTRUCTIVE SLEEP APNEA): Primary | ICD-10-CM

## 2023-04-24 NOTE — PROGRESS NOTES
Sleep Consultation   Stefan Vincent 51 y o  male MRN: [id number]      Reason for consultation: JOSE MANUEL    Requesting physician: Dr John Bowden and Dr Yaz Marcum     Assessment/Plan  1  JOSE MANUEL (obstructive sleep apnea)  Assessment & Plan:  · Cory Cordoba is at high risk for obstructive sleep apnea given his history of snoring, witnessed apneas, choking, gasping gagging for air, body mass index of 35 07, his gender, and history of sleep maintenance insomnia I would like to order home sleep study  · I explained to the patient in details the pathophysiology of obstructive sleep apnea  I also explained the importance of treatment, and the consequences of untreated obstructive sleep apnea on underlying cardiovascular and cerebrovascular risk, morbidity, and mortality  Orders:  -     Ambulatory Referral to Sleep Medicine  -     Home Study; Future    2  Snoring  -     Home Study; Future    3  Excessive daytime sleepiness  -     Home Study; Future    4  Obesity (BMI 35 0-39 9 without comorbidity)  -     Home Study; Future          History of Present Illness   HPI:  Stefan Vincent is a 51 y o  male with PMHx as below who comes in for evaluation of obstructive sleep apnea  He works as an  he feels sleepy during the day he has witnessed snoring, gasping for air loud choking  He goes to bed at 9 PM for sleeping 10 minutes wakes up at 4:15 AM, he tells me that he had a history of COVID recently in December of last year by January he started to have panic attacks and he was started on Zoloft initially had difficulty sleeping with that so Atarax was added with some improvement, he noticed that his anxiety has improved and his sleep quality is improved however he still has excessive daytime sleepiness and fatigue during the day    He also noticed that the snoring and gasping for air is worse when he is sleeping on his back according to his girlfriend, he has history of nightmares, feelings of depression and anxiety as detailed above, he drinks 16 ounce coffee per day denies tobacco alcohol or drug abuse his [place] scale is 4 out of 24  Review of Systems    Other systems are negative except as above        Historical Information   Past Medical History:   Diagnosis Date   • Allergic 1974   • Known health problems: none    • Patient denies medical problems 03/04/2020   • Patient denies medical problems 03/23/2020     Past Surgical History:   Procedure Laterality Date   • COLONOSCOPY     • EGD     • AK LAPAROSCOPIC APPENDECTOMY N/A 7/28/2020    Procedure: APPENDECTOMY LAPAROSCOPIC;  Surgeon: Sherrie Lemon MD;  Location:  MAIN OR;  Service: General   • AK RPR UMBILICAL HRNA 5 YRS/> REDUCIBLE N/A 7/28/2020    Procedure: REPAIR HERNIA UMBILICAL;  Surgeon: Sherrie Lemon MD;  Location:  MAIN OR;  Service: General   • WISDOM TOOTH EXTRACTION       Family History   Problem Relation Age of Onset   • Stroke Mother    • Diabetes Mother    • Diabetes Father    • Hearing loss Father    • Vision loss Father    • Glaucoma Father    • No Known Problems Sister    • Colorectal Cancer Neg Hx    • Colon polyps Neg Hx      Social History     Socioeconomic History   • Marital status:      Spouse name: Not on file   • Number of children: Not on file   • Years of education: Not on file   • Highest education level: Not on file   Occupational History   • Not on file   Tobacco Use   • Smoking status: Never     Passive exposure: Past   • Smokeless tobacco: Never   Vaping Use   • Vaping Use: Never used   Substance and Sexual Activity   • Alcohol use:  Yes     Alcohol/week: 3 0 standard drinks     Types: 1 Glasses of wine, 2 Cans of beer per week     Comment: beer or scotch every now and again   • Drug use: Never     Comment: Denies use   • Sexual activity: Yes     Partners: Female     Birth control/protection: Male Sterilization   Other Topics Concern   • Not on file   Social History Narrative   • Not on file     Social Determinants "of Health     Financial Resource Strain: Not on file   Food Insecurity: Not on file   Transportation Needs: Not on file   Physical Activity: Not on file   Stress: Not on file   Social Connections: Not on file   Intimate Partner Violence: Not At Risk   • Fear of Current or Ex-Partner: No   • Emotionally Abused: No   • Physically Abused: No   • Sexually Abused: No   Housing Stability: Not on file       Occupational History:     Meds/Allergies   No Known Allergies    Home medications:  Prior to Admission medications    Medication Sig Start Date End Date Taking? Authorizing Provider   cyclobenzaprine (FLEXERIL) 10 mg tablet Take 1 tablet (10 mg total) by mouth 3 (three) times a day as needed for muscle spasms 3/19/23  Yes CLAIRE Bhagat   hydrOXYzine HCL (ATARAX) 25 mg tablet Take 1 tablet (25 mg total) by mouth every 6 (six) hours as needed for anxiety 4/21/23  Yes Hayden Paige,    sertraline (ZOLOFT) 25 mg tablet Take 1 tablet (25 mg total) by mouth daily 3/21/23 9/17/23 Yes Ela More PA-C       Vitals:   Blood pressure 128/82, pulse 66, height 6' 1\" (1 854 m), weight 121 kg (265 lb 12 8 oz), SpO2 97 %  ,  Body mass index is 35 07 kg/m²  Neck Circumference: 18 5    Physical Exam  General:  Awake alert and oriented x 3, conversant without conversational dyspnea, NAD, normal affect  HEENT:   Sclera noninjected, nonicteric OU, Nares patent,  no craniofacial abnormalities, Mucous membranes, moist, no oral lesions, normal dentition, Mallampati class 4  NECK:  Trachea midline, no accessory muscle use, no stridor,  JVP not elevated  CARDIAC: Reg, single s1/S2, no m/r/g  PULM: CTA bilaterally no wheezing, rhonchi or rales  ABD: Soft nontender, nondistended, no rebound, no rigidity, no guarding  EXT: No cyanosis, no clubbing, no edema, normal capillary refill  NEURO: no focal neurologic deficits, AAOx3, moving all extremities appropriately    Labs:  I have personally reviewed pertinent lab " "results  , ABG: No results found for: PHART, KCR9FWB, PO2ART, SSM5JWN, F1ABGDIC, BEART, SOURCE, BNP: No results found for: BNP, CBC: No results found for: WBC, HGB, HCT, MCV, PLT, ADJUSTEDWBC, MCH, MCHC, RDW, MPV, NRBC, CMP: No results found for: SODIUM, K, CL, CO2, ANIONGAP, BUN, CREATININE, GLUCOSE, CALCIUM, AST, ALT, ALKPHOS, PROT, BILITOT, EGFR, PT/INR: No results found for: PT, INR, Troponin: No results found for: TROPONINI  Lab Results   Component Value Date    WBC 6 9 02/18/2022    HGB 15 1 02/18/2022    HCT 44 6 02/18/2022    MCV 90 02/18/2022     02/18/2022      Lab Results   Component Value Date    CALCIUM 9 0 07/16/2020    K 4 4 01/27/2023    CO2 23 01/27/2023     01/27/2023    BUN 14 01/27/2023    CREATININE 1 18 01/27/2023     Lab Results   Component Value Date    IRON 101 03/18/2020    TIBC 289 03/18/2020    FERRITIN 265 03/18/2020     Lab Results   Component Value Date    XIEDRFUY53 266 02/18/2022     Lab Results   Component Value Date    FOLATE 9 6 02/18/2022         Sleep studies:  HST: ordered       Parish Lux MD  [facility]       Portions of the record may have been created with voice recognition software  Occasional wrong word or \"sound a like\" substitutions may have occurred due to the inherent limitations of voice recognition software  Read the chart carefully and recognize, using context, where substitutions have occurred    "

## 2023-04-24 NOTE — PATIENT INSTRUCTIONS
Sleep Apnea   AMBULATORY CARE:   Sleep apnea  is a condition that causes you to stop breathing often during sleep  Types of sleep apnea:   Obstructive sleep apnea (JOSE MANUEL)  is the most common kind  The muscles and tissues around your throat relax and block air from passing through  Obesity, use of alcohol or cigarettes, or a family history are common causes  JOSE MANUEL may increase your risk for complications after surgery  Central sleep apnea (CSA)  means your brain does not send signals to the muscles that control breathing  You do not take a breath even though your airway is open  Common causes include medical conditions such as heart failure, being older than 40, or use of opioids  Complex (or mixed) sleep apnea  means you have both obstructive and central sleep apnea  Common signs and symptoms:   Loud snoring or long pauses in breathing    Feeling sleepy, slow, and tired during the day    Snorting, gasping, or choking while you sleep, and waking up suddenly because of these    Feeling irritable during the day    Dry mouth or a headache in the mornings    Heavy night sweating    A hard time thinking, remembering things, or focusing on your tasks the following day    Call your local emergency number (911 in the [address] [facility]) if:   You have chest pain or trouble breathing  Call your doctor if:   You have new or worsening signs or symptoms  You have questions or concerns about your condition or care  Treatment  depends on the kind of apnea you have  A mouth device  may be needed if you have mild sleep apnea  These are designed to keep your throat open  Ask your dentist or healthcare provider about the best mouth device for you  A machine  may be used to help you get more air during sleep  A mask may be placed over your nose and mouth, or just your nose  The mask is hooked to the machine  You will get air through the mask      A continuous positive airway pressure (CPAP) machine  is used to keep your airway open during sleep  The machine blows a gentle stream of air into the mask when you breathe  This helps keep your airway open so you can breathe more regularly  Extra oxygen may be given through the machine  A bilevel positive airway pressure (BiPAP) machine  gives air but lowers the pressure when you breathe out  An adaptive servo-ventilator (ASV)  is a machine that learns your usual breathing pattern  Then, it uses pressure to give you air and prevent stops in your breathing  Surgery  to expand your airway or remove extra tissues may be needed  Surgery is usually only considered if other treatments do not work  Manage or prevent sleep apnea:   Reach and maintain a healthy weight  Ask your healthcare provider what a healthy weight is for you  Ask your provider to help you create a safe weight loss plan if you are overweight  Even a small goal of a 10% weight loss can improve your symptoms  Do not smoke  Nicotine and other chemicals in cigarettes and cigars can cause lung damage  Ask your healthcare provider for information if you currently smoke and need help to quit  E-cigarettes or smokeless tobacco still contain nicotine  Talk to your healthcare provider before you use these products  Do not drink alcohol or take sedative medicine before you go to sleep  Alcohol and sedatives can relax the muscles and tissues around your throat  This can block the airflow to your lungs  Sleep on your side or use pillows designed to prevent sleep apnea  This prevents your tongue or other tissues from blocking your throat  You can also raise the head of your bed  Follow up with your doctor or specialist as directed: You may need to have blood tests during your follow-up visits  Work with your provider to find the right breathing support equipment and settings for you  Write down your questions so you remember to ask them during your visits    © Copyright [employer] 2022 Information is for End User's use only and may not be sold, redistributed or otherwise used for commercial purposes  The above information is an  only  It is not intended as medical advice for individual conditions or treatments  Talk to your doctor, nurse or pharmacist before following any medical regimen to see if it is safe and effective for you

## 2023-05-24 ENCOUNTER — HOSPITAL ENCOUNTER (OUTPATIENT)
Dept: SLEEP CENTER | Facility: HOSPITAL | Age: 48
Discharge: HOME | End: 2023-05-24
Attending: INTERNAL MEDICINE

## 2023-05-24 DIAGNOSIS — G47.33 OSA (OBSTRUCTIVE SLEEP APNEA): ICD-10-CM

## 2023-05-24 DIAGNOSIS — E66.9 OBESITY (BMI 35.0-39.9 WITHOUT COMORBIDITY): ICD-10-CM

## 2023-05-24 DIAGNOSIS — R06.83 SNORING: ICD-10-CM

## 2023-05-24 DIAGNOSIS — G47.19 EXCESSIVE DAYTIME SLEEPINESS: ICD-10-CM

## 2023-05-25 NOTE — PROGRESS NOTES
Home Sleep Study Documentation    HOME STUDY DEVICE: Noxturnal yes                                           Elsy G3 no      Pre-Sleep Home Study:    Set-[facility] and instructions [facility]    Technician performed demonstration for Patient: yes    Return demonstration performed by Patient: yes    Written instructions provided to Patient: yes    Patient signed consent form: yes      Patient waited a lot longer than usual due to keypad for the control room not working  Battery was replaced earlier this week but code reset to factory setting  Maintenance and security was contacted  Security was able to open the door using a wire and security came around 8:35 PM to reprogram the keypad for the door  Tech was very apologetic and patient was really nice and understood the situation  Post-Sleep Home Study:    Additional comments by Patient: None    Home Sleep Study Failed:no:    Failure reason: N/A    Reported or Detected: N/A    Scored by: JAQUI Neal

## 2023-05-26 DIAGNOSIS — G47.33 OSA (OBSTRUCTIVE SLEEP APNEA): Primary | ICD-10-CM

## 2023-05-28 DIAGNOSIS — F41.9 SEVERE ANXIETY: ICD-10-CM

## 2023-05-30 DIAGNOSIS — F41.9 SEVERE ANXIETY: ICD-10-CM

## 2023-05-30 RX ORDER — SERTRALINE HYDROCHLORIDE 25 MG/1
25 TABLET, FILM COATED ORAL DAILY
Qty: 30 TABLET | Refills: 0 | Status: SHIPPED | OUTPATIENT
Start: 2023-05-30 | End: 2023-05-30 | Stop reason: SDUPTHER

## 2023-06-09 ENCOUNTER — TELEPHONE (OUTPATIENT)
Dept: SLEEP CENTER | Facility: CLINIC | Age: 48
End: 2023-06-09

## 2023-06-09 NOTE — TELEPHONE ENCOUNTER
Patient of Dr Farzaneh Elder at [address]  Call placed to patient  Left message home sleep study is resulted and to call the nursing staff to review the results and the provider's recommendations  Study shows moderate JOSE MANUEL (MARÍA-15  4) with event related desaturations  APAP ordered  Patient will need DME setup and compliance follow-up appointments

## 2023-06-23 ENCOUNTER — OFFICE VISIT (OUTPATIENT)
Dept: FAMILY MEDICINE CLINIC | Facility: CLINIC | Age: 48
End: 2023-06-23
Payer: COMMERCIAL

## 2023-06-23 VITALS
TEMPERATURE: 97.8 F | BODY MASS INDEX: 35.04 KG/M2 | SYSTOLIC BLOOD PRESSURE: 120 MMHG | HEIGHT: 73 IN | OXYGEN SATURATION: 98 % | RESPIRATION RATE: 15 BRPM | HEART RATE: 59 BPM | WEIGHT: 264.4 LBS | DIASTOLIC BLOOD PRESSURE: 82 MMHG

## 2023-06-23 DIAGNOSIS — L71.9 ROSACEA: ICD-10-CM

## 2023-06-23 DIAGNOSIS — G47.33 OSA (OBSTRUCTIVE SLEEP APNEA): Primary | ICD-10-CM

## 2023-06-23 DIAGNOSIS — F41.9 ANXIETY: ICD-10-CM

## 2023-06-23 DIAGNOSIS — R23.2 FACIAL FLUSHING: ICD-10-CM

## 2023-06-23 LAB

## 2023-06-23 PROCEDURE — 99214 OFFICE O/P EST MOD 30 MIN: CPT | Performed by: FAMILY MEDICINE

## 2023-06-23 NOTE — PROGRESS NOTES
Severo Elm 1975 male MRN: [id number]    Family Medicine Follow-up Visit    ASSESSMENT/PLAN  Problem List Items Addressed This Visit        Respiratory    JOSE MANUEL (obstructive sleep apnea) - Primary     Following with pulmonary/sleep medicine  Awaiting CPAP            Musculoskeletal and Integument    Rosacea     Stable for now  Will plan seeing derm down the road  No acute concerns            Other    Anxiety     Stable on zoloft 50 mg daily          Facial flushing     Saw rheumatology  No concerns for auto immune disease                      Future Appointments   Date Time Provider Volodymyr Cedillo   8/10/2023  9:30 AM Gali David MD [address] [facility]   9/15/2023  1:00 PM MD Lisa [facility]   1/12/2024  9:15 AM DO CHRISTINE Melara [facility]          SUBJECTIVE  CC: Follow-up (Check up 2 months Anxiety)      HPI:  Severo Elm is a 51 y o  male who presents for follow up    As far as anxiety he is feeling good  He is taking his zoloft 50 mg daily  He will have some minor panic attacks but they are much better       HPI    Review of Systems   Constitutional: Negative for chills and fever  HENT: Negative for congestion, postnasal drip, rhinorrhea and sinus pain  Eyes: Negative for photophobia and visual disturbance  Respiratory: Negative for cough and shortness of breath  Cardiovascular: Negative for chest pain, palpitations and leg swelling  Gastrointestinal: Negative for abdominal pain, constipation, diarrhea, nausea and vomiting  Genitourinary: Negative for difficulty urinating and dysuria  Musculoskeletal: Negative for arthralgias and myalgias  Skin: Negative for rash  Neurological: Negative for dizziness and syncope         Historical Information   The patient history was reviewed as follows:    Past Medical History:   Diagnosis Date   • Allergic 1975   • Known health problems: none    • Patient denies medical problems 03/04/2020   • Patient denies medical problems 03/23/2020     Past Surgical History:   Procedure Laterality Date   • COLONOSCOPY     • EGD     • [place] LAPAROSCOPIC APPENDECTOMY N/A 7/28/2020    Procedure: APPENDECTOMY LAPAROSCOPIC;  Surgeon: Bruce Mcleod MD;  Location: [facility];  Service: General   • NC RPR UMBILICAL HRNA 5 YRS/> REDUCIBLE N/A 7/28/2020    Procedure: REPAIR HERNIA UMBILICAL;  Surgeon: Bruce Mcleod MD;  Location: [facility];  Service: General   • WISDOM TOOTH EXTRACTION       Family History   Problem Relation Age of Onset   • Stroke Mother    • Diabetes Mother    • Diabetes Father    • Hearing loss Father    • Vision loss Father    • Glaucoma Father    • No Known Problems Sister    • Colorectal Cancer Neg Hx    • Colon polyps Neg Hx       Social History   Social History     Substance and Sexual Activity   Alcohol Use Yes   • Alcohol/week: 3 0 standard drinks of alcohol   • Types: 1 Glasses of wine, 2 Cans of beer per week    Comment: beer or scotch every now and again     Social History     Substance and Sexual Activity   Drug Use Never    Comment: Denies use     Social History     Tobacco Use   Smoking Status Never   • Passive exposure: Past   Smokeless Tobacco Never       Medications:     Current Outpatient Medications:   •  sertraline (ZOLOFT) 50 mg tablet, Take 1 tablet (50 mg total) by mouth daily, Disp: 90 tablet, Rfl: 1  •  cyclobenzaprine (FLEXERIL) 10 mg tablet, Take 1 tablet (10 mg total) by mouth 3 (three) times a day as needed for muscle spasms (Patient not taking: Reported on 6/23/2023), Disp: 20 tablet, Rfl: 0  •  hydrOXYzine HCL (ATARAX) 25 mg tablet, Take 1 tablet (25 mg total) by mouth every 6 (six) hours as needed for anxiety (Patient not taking: Reported on 6/23/2023), Disp: 30 tablet, Rfl: 0  No Known Allergies    OBJECTIVE    Vitals:   Vitals:    06/23/23 0835   BP: 120/82   BP Location: Left arm   Patient Position: Sitting   Cuff Size: Large   Pulse: 59   Resp: 15   Temp: 97 8 °F "(36 6 °C)   TempSrc: Tympanic   SpO2: 98%   Weight: 120 kg (264 lb 6 4 oz)   Height: 6' 1\" (1 854 m)           Physical Exam  Constitutional:       Appearance: He is well-developed  HENT:      Head: Normocephalic and atraumatic  Eyes:      Extraocular Movements: Extraocular movements intact  Conjunctiva/sclera: Conjunctivae normal    Cardiovascular:      Rate and Rhythm: Normal rate and regular rhythm  Heart sounds: Normal heart sounds  No murmur heard  Pulmonary:      Effort: Pulmonary effort is normal  No respiratory distress  Breath sounds: Normal breath sounds  No wheezing  Musculoskeletal:         General: Normal range of motion  Cervical back: Normal range of motion and neck supple  Skin:     General: Skin is warm and dry  Neurological:      Mental Status: He is alert and oriented to person, place, and time     Psychiatric:         Mood and Affect: Mood normal          Behavior: Behavior normal             Labs:        DO Charles    6/23/2023      "

## 2023-06-28 LAB
DME PARACHUTE DELIVERY DATE EXPECTED: NORMAL
DME PARACHUTE DELIVERY DATE REQUESTED: NORMAL
DME PARACHUTE DELIVERY NOTE: NORMAL
DME PARACHUTE ITEM DESCRIPTION: NORMAL
DME PARACHUTE ORDER STATUS: NORMAL
DME PARACHUTE SUPPLIER NAME: NORMAL
DME PARACHUTE SUPPLIER PHONE: NORMAL

## 2023-07-21 LAB

## 2023-09-15 ENCOUNTER — TELEPHONE (OUTPATIENT)
Dept: PULMONOLOGY | Facility: CLINIC | Age: 48
End: 2023-09-15

## 2023-09-15 ENCOUNTER — OFFICE VISIT (OUTPATIENT)
Age: 48
End: 2023-09-15
Payer: COMMERCIAL

## 2023-09-15 VITALS
RESPIRATION RATE: 18 BRPM | DIASTOLIC BLOOD PRESSURE: 84 MMHG | HEART RATE: 60 BPM | BODY MASS INDEX: 36.05 KG/M2 | SYSTOLIC BLOOD PRESSURE: 122 MMHG | HEIGHT: 73 IN | WEIGHT: 272 LBS | OXYGEN SATURATION: 99 %

## 2023-09-15 DIAGNOSIS — G47.33 OSA (OBSTRUCTIVE SLEEP APNEA): Primary | ICD-10-CM

## 2023-09-15 DIAGNOSIS — E66.9 OBESITY (BMI 35.0-39.9 WITHOUT COMORBIDITY): ICD-10-CM

## 2023-09-15 DIAGNOSIS — R06.02 SOB (SHORTNESS OF BREATH): ICD-10-CM

## 2023-09-15 PROCEDURE — 99214 OFFICE O/P EST MOD 30 MIN: CPT | Performed by: INTERNAL MEDICINE

## 2023-09-15 NOTE — ASSESSMENT & PLAN NOTE
Dyspnea on exertion mainly related to deconditioning and obesity, counseled for weight loss.   He is in the process of getting cardiology evaluation for screening purposes

## 2023-09-15 NOTE — PROGRESS NOTES
Pulmonary/Sleep Follow Up Note   Mercy Leon 51 y.o. male MRN: [id number]  9/15/2023      Assessment and Plan:    1. JOSE MANUEL (obstructive sleep apnea)  Assessment & Plan: Moderate JOSE MANUEL with AHI 15.4 events per hour  Currently on auto CPAP with significant improvement down AHI of 0.3 compliant using the machine 90% of the time averaging almost 7 hours    Subjectively feels significant improvement of his sleep quality and excessive daytime sleepiness    Reminded him of the importance of continue using the CPAP      2. Obesity (BMI 35.0-39.9 without comorbidity)  Assessment & Plan:  BMI 35.89    Discussed the importance of continuing using the CPAP and implementing more healthy lifestyle steps      3. SOB (shortness of breath)  Assessment & Plan:  Dyspnea on exertion mainly related to deconditioning and obesity, counseled for weight loss. He is in the process of getting cardiology evaluation for screening purposes        F/U in one year    History of Present Illness   HPI:  Mercy Leon is a 51 y.o. male who is here for a follow-up appointment he was recently seen diagnosed with moderate obstructive sleep apnea. He is here for follow-up and compliance visit. He has been using his CPAP machine with adequate improvement of excessive daytime sleepiness. He has still residual chronic fatigue on if he sleeps less than 7 hours per night, dyspnea on exertion which has been noticing mainly since he gained weight after he had COVID last winter but he denies wheezing or cough. He has never been a smoker. Review of Systems   Constitutional: Negative for appetite change and fever. HENT: Negative for ear pain, postnasal drip, rhinorrhea, sneezing, sore throat and trouble swallowing. Respiratory: Positive for shortness of breath. Cardiovascular: Negative for chest pain. Musculoskeletal: Positive for myalgias. Neurological: Negative for headaches.        Historical Information   Past Medical History: Diagnosis Date   • Allergic 1975   • Known health problems: none    • Patient denies medical problems 03/04/2020   • Patient denies medical problems 03/23/2020     Past Surgical History:   Procedure Laterality Date   • COLONOSCOPY     • EGD     • [place] LAPAROSCOPIC APPENDECTOMY N/A 7/28/2020    Procedure: APPENDECTOMY LAPAROSCOPIC;  Surgeon: Maria C Boswell MD;  Location:  MAIN OR;  Service: General   • [place] RPR UMBILICAL HRNA 5 YRS/> REDUCIBLE N/A 7/28/2020    Procedure: REPAIR HERNIA UMBILICAL;  Surgeon: Maria C Boswell MD;  Location:  MAIN OR;  Service: General   • WISDOM TOOTH EXTRACTION       Family History   Problem Relation Age of Onset   • Stroke Mother    • Diabetes Mother    • Diabetes Father    • Hearing loss Father    • Vision loss Father    • Glaucoma Father    • No Known Problems Sister    • Colorectal Cancer Neg Hx    • Colon polyps Neg Hx          Meds/Allergies     Current Outpatient Medications:   •  sertraline (ZOLOFT) 50 mg tablet, Take 1 tablet (50 mg total) by mouth daily, Disp: 90 tablet, Rfl: 1  •  cyclobenzaprine (FLEXERIL) 10 mg tablet, Take 1 tablet (10 mg total) by mouth 3 (three) times a day as needed for muscle spasms (Patient not taking: Reported on 6/23/2023), Disp: 20 tablet, Rfl: 0  •  hydrOXYzine HCL (ATARAX) 25 mg tablet, Take 1 tablet (25 mg total) by mouth every 6 (six) hours as needed for anxiety (Patient not taking: Reported on 6/23/2023), Disp: 30 tablet, Rfl: 0  No Known Allergies    Vitals: Blood pressure 122/84, pulse 60, resp. rate 18, height 6' 1" (1.854 m), weight 123 kg (272 lb), SpO2 99 %. Body mass index is 35.89 kg/m².  Oxygen Therapy  SpO2: 99 %      Physical Exam  General:  Awake alert and oriented x 3, conversant without conversational dyspnea, NAD, normal affect  HEENT:   Sclera noninjected, nonicteric OU, Nares patent,  no craniofacial abnormalities, Mucous membranes, moist, no oral lesions, normal dentition  NECK:  Trachea midline, no accessory muscle use, no stridor,  JVP not elevated  CARDIAC: Reg, single s1/S2, no m/r/g  PULM: CTA bilaterally no wheezing, rhonchi or rales  EXT: No cyanosis, no clubbing, no edema, normal capillary refill  NEURO: no focal neurologic deficits, AAOx3, moving all extremities appropriately    Labs: I have personally reviewed pertinent lab results. , ABG: No results found for: "PHART", "IAT8BTN", "PO2ART", "CXZ0CHH", "C5GFJATS", "BEART", "SOURCE", BNP: No results found for: "BNP", CBC: No results found for: "WBC", "HGB", "HCT", "MCV", "PLT", "ADJUSTEDWBC", "RBC", "MCH", "MCHC", "RDW", "MPV", "NRBC", CMP: No results found for: "SODIUM", "K", "CL", "CO2", "ANIONGAP", "BUN", "CREATININE", "GLUCOSE", "CALCIUM", "AST", "ALT", "ALKPHOS", "PROT", "BILITOT", "EGFR", PT/INR: No results found for: "PT", "INR", Troponin: No results found for: "TROPONINI"  Lab Results   Component Value Date    WBC 6.9 02/18/2022    HGB 15.1 02/18/2022    HCT 44.6 02/18/2022    MCV 90 02/18/2022     02/18/2022     Lab Results   Component Value Date    CALCIUM 9.0 07/16/2020    K 4.4 01/27/2023    CO2 23 01/27/2023     01/27/2023    BUN 14 01/27/2023    CREATININE 1.18 01/27/2023     No results found for: "IGE"  Lab Results   Component Value Date    ALT 33 01/27/2023    AST 20 01/27/2023    GGT 30 03/18/2020    ALKPHOS 63 07/16/2020       Sleep studies: I have personally reviewed pertinent reports. HST: AHI 15.4 events/hr --> moderate JOSE MANUEL           Compliance report:I have personally reviewed pertinent reports. Type of CPAP:  APAP 5-20                                   Percent usage: 90%                                   Average time used: 6 hrs 57 min                                  Time in large leak: not sig                                   Residual AHI: 0.3            Kristin Alicia MD  [facility]       Portions of the record may have been created with voice recognition software.  Occasional wrong word or "sound a like" substitutions may have occurred due to the inherent limitations of voice recognition software. Read the chart carefully and recognize, using context, where substitutions have occurred.

## 2023-09-15 NOTE — ASSESSMENT & PLAN NOTE
BMI 35.89    Discussed the importance of continuing using the CPAP and implementing more healthy lifestyle steps

## 2023-09-15 NOTE — PATIENT INSTRUCTIONS
CPAP   AMBULATORY CARE:   CPAP (continuous positive airway pressure)  is a treatment that uses air pressure to keep your airways open while you sleep. CPAP is used to treat obstructive sleep apnea (JOSE MANUEL). A CPAP machine connects the mask to the machine with a hose. Air pressure prevents your airway from collapsing or becoming blocked during sleep. Use your CPAP machine when you sleep, even when you nap. You may need to use a CPAP machine for the rest of your life. Make CPAP easier to use: At first, try to use your CPAP for a few hours every night. Then slowly increase the length of time you use your machine. It takes time to adjust to CPAP treatment. You may need to use a special moisturizer made for CPAP users. You may need a mask that is a different size, shape, or material. Talk to your healthcare provider if your mask feels uncomfortable or irritates your skin. Use a saline nasal spray at bedtime to help relieve nasal irritation. A chin strap to help keep your mouth closed. A different type of mask can help dry mouth. Some machines come with a heated humidifier to help relieve these symptoms. Talk to your healthcare provider if you are having problems adjusting to the air pressure. He or she can tell you how to adjust the air pressure on your CPAP. You may need to start at a lower pressure and slowly increase it over time. Call your doctor if:   You continue to feel very sleepy during the day, even after you wear your CPAP device as directed. Your CPAP is causing a problem, such as a rash, that does not improve. You have questions or concerns about your condition, care, or equipment. Follow up with your doctor as directed:  Tell your healthcare provider if your mask no longer fits properly. Write down your questions so you remember to ask them during your visits.   © Copyright [facility] 2022 Information is for End User's use only and may not be sold, redistributed or otherwise used for commercial purposes. The above information is an  only. It is not intended as medical advice for individual conditions or treatments. Talk to your doctor, nurse or pharmacist before following any medical regimen to see if it is safe and effective for you.

## 2023-09-15 NOTE — ASSESSMENT & PLAN NOTE
Moderate JOSE MANUEL with AHI 15.4 events per hour  Currently on auto CPAP with significant improvement down AHI of 0.3 compliant using the machine 90% of the time averaging almost 7 hours    Subjectively feels significant improvement of his sleep quality and excessive daytime sleepiness    Reminded him of the importance of continue using the CPAP

## 2023-11-28 DIAGNOSIS — F41.9 SEVERE ANXIETY: ICD-10-CM

## 2023-12-11 NOTE — PROGRESS NOTES
Name: Kevin Sheikh      : 1975      MRN: [id number]  Encounter Provider: Perry Machado PA-C  Encounter Date: 2023   Encounter department: [facility]    Assessment & Plan     1  Icterus  -     Comprehensive metabolic panel; Future  -     Comprehensive metabolic panel    2  Screening for prostate cancer  -     PSA, Total Screen; Future    3  Decreased urine stream  -     PSA, Total Screen; Future         Will call patient with lab results  Patient instructed call with any questions or concerns  Subjective      HPI   19-year-old male here today for reevaluation of urinary frequency  Patient was seen in urgent care prescribed Keflex for urinary frequency  Urine culture showing no growth   states his symptoms have essentially resolved  He has occasional decrease in urine stream   Does report that a week ago he noticed his sclera/eyes were yellow  This has resolved as well  Previous CMP not suggesting any liver issues  Review of Systems  Currently denies any fever, chills, headaches, dizziness, chest pain, shortness breath, abdominal pain,  Positive per HPI  All Other systems negative  Current Outpatient Medications on File Prior to Visit   Medication Sig   • cephalexin (KEFLEX) 500 mg capsule Take 1 capsule (500 mg total) by mouth every 12 (twelve) hours for 7 days (Patient not taking: Reported on 2023)       Objective     /82 (BP Location: Left arm, Patient Position: Sitting, Cuff Size: Large)   Pulse 64   Temp 97 5 °F (36 4 °C) (Tympanic)   Resp 17   Ht 6' 1" (1 854 m)   Wt 120 kg (264 lb 12 8 oz)   SpO2 98%   BMI 34 94 kg/m²     Physical Exam  Vitals and nursing note reviewed  Constitutional:       General: He is not in acute distress  Appearance: He is not ill-appearing, toxic-appearing or diaphoretic  HENT:      Head: Normocephalic  Eyes:      General: No scleral icterus       Conjunctiva/sclera: Conjunctivae normal    Cardiovascular: Rate and Rhythm: Normal rate  Pulmonary:      Effort: Pulmonary effort is normal    Abdominal:      General: Bowel sounds are normal       Palpations: Abdomen is soft  Tenderness: There is no abdominal tenderness  Neurological:      Mental Status: He is alert and oriented to person, place, and time     Psychiatric:         Mood and Affect: Mood normal        Arabella Oden PA-C (4) no impairment

## 2024-01-12 ENCOUNTER — OFFICE VISIT (OUTPATIENT)
Dept: FAMILY MEDICINE CLINIC | Facility: CLINIC | Age: 49
End: 2024-01-12
Payer: COMMERCIAL

## 2024-01-12 VITALS
HEART RATE: 70 BPM | RESPIRATION RATE: 17 BRPM | BODY MASS INDEX: 37.72 KG/M2 | OXYGEN SATURATION: 96 % | TEMPERATURE: 98.2 F | DIASTOLIC BLOOD PRESSURE: 80 MMHG | SYSTOLIC BLOOD PRESSURE: 124 MMHG | WEIGHT: 284.6 LBS | HEIGHT: 73 IN

## 2024-01-12 DIAGNOSIS — C43.30 MALIGNANT MELANOMA OF FACE (HCC): ICD-10-CM

## 2024-01-12 DIAGNOSIS — Z13.6 SCREENING FOR CARDIOVASCULAR CONDITION: ICD-10-CM

## 2024-01-12 DIAGNOSIS — Z13.1 SCREENING FOR DIABETES MELLITUS: ICD-10-CM

## 2024-01-12 DIAGNOSIS — F41.9 SEVERE ANXIETY: ICD-10-CM

## 2024-01-12 DIAGNOSIS — Z00.00 ANNUAL PHYSICAL EXAM: Primary | ICD-10-CM

## 2024-01-12 DIAGNOSIS — Z12.5 SCREENING FOR PROSTATE CANCER: ICD-10-CM

## 2024-01-12 DIAGNOSIS — M35.3 POLYMYALGIA (HCC): ICD-10-CM

## 2024-01-12 PROCEDURE — 99396 PREV VISIT EST AGE 40-64: CPT | Performed by: FAMILY MEDICINE

## 2024-01-12 NOTE — PROGRESS NOTES
ADULT ANNUAL PHYSICAL  [facility]    NAME: Erick Thompson  AGE: 48 y.o. SEX: male  : 1975     DATE: 2024     Assessment and Plan:     Problem List Items Addressed This Visit          Musculoskeletal and Integument    Polymyalgia (HCC)     stable         Relevant Orders    Lipid panel    Comprehensive metabolic panel    CBC and differential    TSH, 3rd generation with Free T4 reflex    UA (URINE) with reflex to Scope       Other    Annual physical exam - Primary    Relevant Orders    Lipid panel    Comprehensive metabolic panel    CBC and differential    TSH, 3rd generation with Free T4 reflex    UA (URINE) with reflex to Scope    PSA (Reflex To Free) (Serial)    Screening for diabetes mellitus    Relevant Orders    Lipid panel    Comprehensive metabolic panel    Screening for cardiovascular condition    Relevant Orders    Lipid panel    Comprehensive metabolic panel    Malignant melanoma of face (HCC)     Follows with dermatology           Other Visit Diagnoses       Severe anxiety        Relevant Medications    sertraline (ZOLOFT) 50 mg tablet    Other Relevant Orders    Lipid panel    Comprehensive metabolic panel    CBC and differential    TSH, 3rd generation with Free T4 reflex    UA (URINE) with reflex to Scope    Screening for prostate cancer        Relevant Orders    PSA (Reflex To Free) (Serial)            Discussed with patient that after age 49 he will qualify for the SHINGREX and can return for a nurse visit if he would like to receive here at our office.     Immunizations and preventive care screenings were discussed with patient today. Appropriate education was printed on patient's after visit summary.    Discussed risks and benefits of prostate cancer screening. We discussed the controversial history of PSA screening for prostate cancer in the [place] as well as the risk of over detection and over treatment of prostate  cancer by way of PSA screening.  The patient understands that PSA blood testing is an imperfect way to screen for prostate cancer and that elevated PSA levels in the blood may also be caused by infection, inflammation, prostatic trauma or manipulation, urological procedures, or by benign prostatic enlargement.    The role of the digital rectal examination in prostate cancer screening was also discussed and I discussed with him that there is large interobserver variability in the findings of digital rectal examination.    Counseling:  Alcohol/drug use: discussed moderation in alcohol intake, the recommendations for healthy alcohol use, and avoidance of illicit drug use.  Dental Health: discussed importance of regular tooth brushing, flossing, and dental visits.  Injury prevention: discussed safety/seat belts, safety helmets, smoke detectors, carbon dioxide detectors, and smoking near bedding or upholstery.  Sexual health: discussed sexually transmitted diseases, partner selection, use of condoms, avoidance of unintended pregnancy, and contraceptive alternatives.  Exercise: the importance of regular exercise/physical activity was discussed. Recommend exercise 3-5 times per week for at least 30 minutes.          Return in about 6 months (around 7/12/2024) for medication check up.     Chief Complaint:     Chief Complaint   Patient presents with    Annual Exam     Complete Physical      History of Present Illness:     Adult Annual Physical   Patient here for a comprehensive physical exam. The patient reports no problems.    Diet and Physical Activity  Diet/Nutrition: well balanced diet.   Exercise: moderate cardiovascular exercise.      Depression Screening  PHQ-2/9 Depression Screening    Little interest or pleasure in doing things: 0 - not at all  Feeling down, depressed, or hopeless: 0 - not at all  PHQ-2 Score: 0  PHQ-2 Interpretation: Negative depression screen          Review of Systems:     Review of Systems    Constitutional:  Negative for chills and fever.   HENT:  Negative for congestion, postnasal drip, rhinorrhea and sinus pain.    Eyes:  Negative for photophobia and visual disturbance.   Respiratory:  Negative for cough and shortness of breath.    Cardiovascular:  Negative for chest pain, palpitations and leg swelling.   Gastrointestinal:  Negative for abdominal pain, constipation, diarrhea, nausea and vomiting.   Genitourinary:  Negative for difficulty urinating and dysuria.   Musculoskeletal:  Negative for arthralgias and myalgias.   Skin:  Negative for rash.   Neurological:  Negative for dizziness and syncope.      Past Medical History:     Past Medical History:   Diagnosis Date    Allergic 1974    Known health problems: none     Patient denies medical problems 03/04/2020    Patient denies medical problems 03/23/2020      Past Surgical History:     Past Surgical History:   Procedure Laterality Date    COLONOSCOPY      EGD      [place] LAPAROSCOPIC APPENDECTOMY N/A 7/28/2020    Procedure: APPENDECTOMY LAPAROSCOPIC;  Surgeon: Abe Knapp MD;  Location:  MAIN OR;  Service: General    [place] RPR UMBILICAL HRNA 5 YRS/> REDUCIBLE N/A 7/28/2020    Procedure: REPAIR HERNIA UMBILICAL;  Surgeon: Abe Knapp MD;  Location:  MAIN OR;  Service: General    WISDOM TOOTH EXTRACTION        Family History:     Family History   Problem Relation Age of Onset    Stroke Mother     Diabetes Mother     Diabetes Father     Hearing loss Father     Vision loss Father     Glaucoma Father     No Known Problems Sister     Colorectal Cancer Neg Hx     Colon polyps Neg Hx       Social History:     Social History     Socioeconomic History    Marital status:      Spouse name: None    Number of children: None    Years of education: None    Highest education level: None   Occupational History    None   Tobacco Use    Smoking status: Never     Passive exposure: Past    Smokeless tobacco: Never   Vaping Use    Vaping status:  Never Used   Substance and Sexual Activity    Alcohol use: Yes     Alcohol/week: 3.0 standard drinks of alcohol     Types: 1 Glasses of wine, 2 Cans of beer per week     Comment: beer or scotch every now and again    Drug use: Never     Comment: Denies use    Sexual activity: Yes     Partners: Female     Birth control/protection: Male Sterilization   Other Topics Concern    None   Social History Narrative    None     Social Determinants of Health     Financial Resource Strain: Low Risk  (2/7/2020)    Overall Financial Resource Strain (CARDIA)     Difficulty of Paying Living Expenses: Not hard at all   Food Insecurity: No Food Insecurity (2/7/2020)    Hunger Vital Sign     Worried About Running Out of Food in the Last Year: Never true     Ran Out of Food in the Last Year: Never true   Transportation Needs: No Transportation Needs (2/7/2020)    PRAPARE - Transportation     Lack of Transportation (Medical): No     Lack of Transportation (Non-Medical): No   Physical Activity: Inactive (2/7/2020)    Exercise Vital Sign     Days of Exercise per Week: 0 days     Minutes of Exercise per Session: 0 min   Stress: No Stress Concern Present (2/7/2020)    [employer] [place] [employer] - Occupational Stress Questionnaire     Feeling of Stress : Not at all   Social Connections: Socially Integrated (2/12/2021)    Social Connection and Isolation Panel [NHANES]     Frequency of Communication with Friends and Family: More than three times a week     Frequency of Social Gatherings with Friends and Family: More than three times a week     Attends Mu-ism Services: More than 4 times per year     Active Member of Clubs or Organizations: Yes     Attends Club or Organization Meetings: 1 to 4 times per year     Marital Status:    Intimate Partner Violence: Not At Risk (6/23/2023)    Humiliation, Afraid, Rape, and Kick questionnaire     Fear of Current or Ex-Partner: No     Emotionally Abused: No     Physically Abused: No  "    Sexually Abused: No   Housing Stability: Not on file      Current Medications:     Current Outpatient Medications   Medication Sig Dispense Refill    cyclobenzaprine (FLEXERIL) 10 mg tablet Take 1 tablet (10 mg total) by mouth 3 (three) times a day as needed for muscle spasms 20 tablet 0    hydrOXYzine HCL (ATARAX) 25 mg tablet Take 1 tablet (25 mg total) by mouth every 6 (six) hours as needed for anxiety 30 tablet 0    sertraline (ZOLOFT) 50 mg tablet Take 1 tablet (50 mg total) by mouth daily 90 tablet 3     No current facility-administered medications for this visit.      Allergies:     No Known Allergies   Physical Exam:     /80 (BP Location: Right arm, Patient Position: Sitting, Cuff Size: Large)   Pulse 70   Temp 98.2 °F (36.8 °C) (Tympanic)   Resp 17   Ht 6' 1\" (1.854 m)   Wt 129 kg (284 lb 9.6 oz)   SpO2 96%   BMI 37.55 kg/m²     Physical Exam  Constitutional:       General: He is not in acute distress.     Appearance: Normal appearance. He is not ill-appearing, toxic-appearing or diaphoretic.   HENT:      Head: Normocephalic and atraumatic.      Right Ear: Tympanic membrane and ear canal normal.      Left Ear: Tympanic membrane and ear canal normal.      Nose: Nose normal. No congestion.      Mouth/Throat:      Mouth: Mucous membranes are moist.      Pharynx: Oropharynx is clear. No oropharyngeal exudate.   Eyes:      Extraocular Movements: Extraocular movements intact.      Conjunctiva/sclera: Conjunctivae normal.      Pupils: Pupils are equal, round, and reactive to light.   Cardiovascular:      Rate and Rhythm: Normal rate and regular rhythm.      Pulses: Normal pulses.      Heart sounds: No murmur heard.  Pulmonary:      Effort: Pulmonary effort is normal.      Breath sounds: Normal breath sounds. No wheezing, rhonchi or rales.   Abdominal:      General: Bowel sounds are normal. There is no distension.      Palpations: Abdomen is soft.      Tenderness: There is no abdominal tenderness. "   Musculoskeletal:         General: No swelling or tenderness. Normal range of motion.      Cervical back: Normal range of motion and neck supple.   Skin:     General: Skin is warm and dry.      Capillary Refill: Capillary refill takes less than 2 seconds.   Neurological:      General: No focal deficit present.      Mental Status: He is alert and oriented to person, place, and time.      Cranial Nerves: No cranial nerve deficit.   Psychiatric:         Mood and Affect: Mood normal.         Behavior: Behavior normal.         Thought Content: Thought content normal.          Carmela Campbell,   [facility]

## 2024-02-17 LAB
ALBUMIN SERPL-MCNC: 4.5 G/DL (ref 4.1–5.1)
ALBUMIN/GLOB SERPL: 2 {RATIO} (ref 1.2–2.2)
ALP SERPL-CCNC: 64 IU/L (ref 44–121)
ALT SERPL-CCNC: 29 IU/L (ref 0–44)
APPEARANCE UR: CLEAR
AST SERPL-CCNC: 22 IU/L (ref 0–40)
BASOPHILS # BLD AUTO: 0.1 X10E3/UL (ref 0–0.2)
BASOPHILS NFR BLD AUTO: 1 %
BILIRUB SERPL-MCNC: 0.5 MG/DL (ref 0–1.2)
BILIRUB UR QL STRIP: NEGATIVE
BUN SERPL-MCNC: 15 MG/DL (ref 6–24)
BUN/CREAT SERPL: 12 (ref 9–20)
CALCIUM SERPL-MCNC: 9.5 MG/DL (ref 8.7–10.2)
CHLORIDE SERPL-SCNC: 103 MMOL/L (ref 96–106)
CHOLEST SERPL-MCNC: 192 MG/DL (ref 100–199)
CHOLEST/HDLC SERPL: 6.2 RATIO (ref 0–5)
CO2 SERPL-SCNC: 22 MMOL/L (ref 20–29)
COLOR UR: YELLOW
CREAT SERPL-MCNC: 1.22 MG/DL (ref 0.76–1.27)
EGFR: 72 ML/MIN/1.73
EOSINOPHIL # BLD AUTO: 0.2 X10E3/UL (ref 0–0.4)
EOSINOPHIL NFR BLD AUTO: 2 %
ERYTHROCYTE [DISTWIDTH] IN BLOOD BY AUTOMATED COUNT: 13.2 % (ref 11.6–15.4)
GLOBULIN SER-MCNC: 2.3 G/DL (ref 1.5–4.5)
GLUCOSE SERPL-MCNC: 95 MG/DL (ref 70–99)
GLUCOSE UR QL: NEGATIVE
HCT VFR BLD AUTO: 42 % (ref 37.5–51)
HDLC SERPL-MCNC: 31 MG/DL
HGB BLD-MCNC: 14.6 G/DL (ref 13–17.7)
HGB UR QL STRIP: NEGATIVE
IMM GRANULOCYTES # BLD: 0 X10E3/UL (ref 0–0.1)
IMM GRANULOCYTES NFR BLD: 0 %
KETONES UR QL STRIP: NEGATIVE
LDLC SERPL CALC-MCNC: 126 MG/DL (ref 0–99)
LEUKOCYTE ESTERASE UR QL STRIP: NEGATIVE
LYMPHOCYTES # BLD AUTO: 1.9 X10E3/UL (ref 0.7–3.1)
LYMPHOCYTES NFR BLD AUTO: 27 %
MCH RBC QN AUTO: 31.1 PG (ref 26.6–33)
MCHC RBC AUTO-ENTMCNC: 34.8 G/DL (ref 31.5–35.7)
MCV RBC AUTO: 90 FL (ref 79–97)
MICRO URNS: NORMAL
MONOCYTES # BLD AUTO: 0.7 X10E3/UL (ref 0.1–0.9)
MONOCYTES NFR BLD AUTO: 10 %
NEUTROPHILS # BLD AUTO: 4.2 X10E3/UL (ref 1.4–7)
NEUTROPHILS NFR BLD AUTO: 60 %
NITRITE UR QL STRIP: NEGATIVE
PH UR STRIP: 6.5 [PH] (ref 5–7.5)
PLATELET # BLD AUTO: 226 X10E3/UL (ref 150–450)
POTASSIUM SERPL-SCNC: 4.5 MMOL/L (ref 3.5–5.2)
PROT SERPL-MCNC: 6.8 G/DL (ref 6–8.5)
PROT UR QL STRIP: NEGATIVE
PSA SERPL-MCNC: 0.9 NG/ML (ref 0–4)
RBC # BLD AUTO: 4.69 X10E6/UL (ref 4.14–5.8)
SL AMB REFLEX CRITERIA: NORMAL
SL AMB VLDL CHOLESTEROL CALC: 35 MG/DL (ref 5–40)
SODIUM SERPL-SCNC: 140 MMOL/L (ref 134–144)
SP GR UR: 1.01 (ref 1–1.03)
TRIGL SERPL-MCNC: 197 MG/DL (ref 0–149)
TSH SERPL DL<=0.005 MIU/L-ACNC: 2.65 UIU/ML (ref 0.45–4.5)
UROBILINOGEN UR STRIP-ACNC: 0.2 MG/DL (ref 0.2–1)
WBC # BLD AUTO: 7.1 X10E3/UL (ref 3.4–10.8)

## 2024-02-21 PROBLEM — Z13.6 SCREENING FOR CARDIOVASCULAR CONDITION: Status: RESOLVED | Noted: 2020-02-07 | Resolved: 2024-02-21

## 2024-02-21 PROBLEM — Z13.1 SCREENING FOR DIABETES MELLITUS: Status: RESOLVED | Noted: 2020-02-07 | Resolved: 2024-02-21

## 2024-03-09 DIAGNOSIS — F41.9 SEVERE ANXIETY: ICD-10-CM

## 2024-06-10 DIAGNOSIS — F41.9 SEVERE ANXIETY: ICD-10-CM

## 2024-07-18 ENCOUNTER — TELEPHONE (OUTPATIENT)
Dept: PULMONOLOGY | Facility: CLINIC | Age: 49
End: 2024-07-18

## 2024-07-18 NOTE — TELEPHONE ENCOUNTER
Called patient to schedule appointment for the 1Y FU from the  Recall List and left a voicemail message.

## 2024-07-22 NOTE — TELEPHONE ENCOUNTER
Erick called in following up on vm he received to schedule his 1 year Fu , Patient moved out the area and he is going to be seeing a pulmonary doctor at [facility] . Patient is requesting for his medical records to be faxed over to the office    Fax # [phone]

## 2024-11-05 NOTE — ASSESSMENT & PLAN NOTE
Advised to continue follow up with dermatology Patient : Jorge Luis Solorzano Age: 56 year old Sex: male   MRN: [id number] Encounter Date: 11/5/2024  H05/H05   History     Chief Complaint   Patient presents with    Leg Swelling    Abdominal Pain       HPI  Jorge Luis Solorzano is a 56 year old presenting to the emergency department for evaluation of left leg swelling and pain. Pt reports worsening left leg pain and swelling since yesterday. He has chronic BLE swelling, but his current severe left leg pain is atypical. He also mentions abdominal swelling. He has a hx/o liver cancer and undergoes paracentesis. His last paracentesis was a month ago and he was supposed to schedule for this, but has not. He is on lasix. He is on Eliquis given hx/o DVT.  No other complaints at this time.          I have reviewed Jorge Luis Solorzano's previous  US BLE on 9/22/24 .  Note Review Summary: Nonexclusive DVT of the LLE of the popliteal vein.     Past/Family/Social History     Allergies   Allergen Reactions    Ibuprofen GI UPSET       No current facility-administered medications for this encounter.     Current Outpatient Medications   Medication Sig    HYDROcodone-acetaminophen (NORCO) 5-325 MG per tablet Take 1 tablet by mouth every 6 hours as needed for Pain.    apixaBAN (ELIQUIS) 5 MG Tab Take 2 tablets by mouth every 12 hours for 6 days, THEN 1 tablet every 12 hours.    pantoprazole (PROTONIX) 40 MG tablet Take 1 tablet by mouth in the morning and 1 tablet in the evening. Take before meals.    traMADol (ULTRAM) 50 MG tablet Take 1 tablet by mouth every 8 hours as needed for Pain.    furosemide (LASIX) 40 MG tablet Take 1 tablet by mouth daily.    spironolactone (ALDACTONE) 100 MG tablet Take 1 tablet by mouth daily.    lidocaine (LIDODERM) 5 % patch Place 1 patch onto the skin every 24 hours. Remove patch 12 hours after applying    ergocalciferol (DRISDOL) 1.25 mg (50,000 units) capsule Take 1 capsule by mouth 1 day a week. Do not start before March 5, 2024.       Past Medical History:   Diagnosis  Date    Anemia, unspecified type 6/7/2024    Anxiety     Arthritis     Chronic low back pain     Chronic pain     back     Colitis 12/19/2018    Depression     Essential (primary) hypertension     Fracture     arm and leg as a child    Hepatitis     Liver disease     Substance abuse  (CMD)     Thyroid condition        Past Surgical History:   Procedure Laterality Date    Colonoscopy      Esophagogastroduodenoscopy      Liver biopsy  03/01/2019    Orbital fracture surgery Left     1990's    Tendon repair Left     teenager       Family History   Adopted: Yes   Family history unknown: Yes       Social History     Tobacco Use    Smoking status: Every Day     Current packs/day: 0.50     Average packs/day: 0.5 packs/day for 78.0 years (39.0 ttl pk-yrs)     Types: Cigarettes    Smokeless tobacco: Never   Vaping Use    Vaping status: never used   Substance Use Topics    Alcohol use: Not Currently     Alcohol/week: 14.0 standard drinks of alcohol     Types: 14 Cans of beer per week     Comment: last drink on 8/3/2024 per pt    Drug use: Yes     Frequency: 3.0 times per week     Types: Marijuana          Review of Systems   Review of Symptoms     Review of Systems   Reason unable to perform ROS: Per HPI.          Physical Exam   Physical Exam     ED Triage Vitals [11/05/24 1236]   ED Triage Vitals Group      Temp 98.8 °F (37.1 °C)      Heart Rate 83      Resp 18      /86      SpO2 100 %      EtCO2 mmHg       Height       Weight       Weight Scale Used       BMI (Calculated)       IBW/kg (Calculated)        Physical Exam  Vitals and nursing note reviewed.   Constitutional:       General: He is not in acute distress.     Appearance: He is well-developed.      Comments: He is moaning in pain to the left leg   HENT:      Head: Normocephalic and atraumatic.      Mouth/Throat:      Pharynx: No oropharyngeal exudate.   Eyes:      Conjunctiva/sclera: Conjunctivae normal.      Pupils: Pupils are equal, round, and reactive to  light.   Cardiovascular:      Rate and Rhythm: Normal rate and regular rhythm.      Pulses:           Dorsalis pedis pulses are 2+ on the right side and 2+ on the left side.      Heart sounds: Normal heart sounds. No murmur heard.     No friction rub.   Pulmonary:      Effort: Pulmonary effort is normal. No respiratory distress.      Breath sounds: Normal breath sounds. No wheezing or rales.   Abdominal:      General: Bowel sounds are normal. There is no distension.      Palpations: Abdomen is soft.      Tenderness: There is no abdominal tenderness. There is no guarding or rebound.      Comments: Abdomen is protuberant and firm. Suspect ascites.   Musculoskeletal:      Cervical back: Normal range of motion and neck supple.      Right lower le+ Pitting Edema present.      Left lower le+ Pitting Edema present.      Comments: Significant tenderness to the medial calf and into the medial distal thigh of the left leg. Diminished ROM secondary to pain.   Lymphadenopathy:      Cervical: No cervical adenopathy.   Skin:     General: Skin is warm and dry.      Findings: No rash.   Neurological:      Mental Status: He is alert and oriented to person, place, and time.      GCS: GCS eye subscore is 4. GCS verbal subscore is 5. GCS motor subscore is 6.      Cranial Nerves: No cranial nerve deficit.   Psychiatric:         Behavior: Behavior normal.         Thought Content: Thought content normal.         Judgment: Judgment normal.            Procedures   ED Procedures     Procedures     Lab Results   ED Lab     Results for orders placed or performed during the hospital encounter of 24   Comprehensive Metabolic Panel    Specimen: Blood, Venous   Result Value Ref Range    Fasting Status      Sodium 132 (L) 135 - 145 mmol/L    Potassium 3.9 3.4 - 5.1 mmol/L    Chloride 99 97 - 110 mmol/L    Carbon Dioxide 27 21 - 32 mmol/L    Anion Gap 10 7 - 19 mmol/L    Glucose 78 70 - 99 mg/dL    BUN 8 6 - 20 mg/dL    Creatinine 0.54  (L) 0.67 - 1.17 mg/dL    Glomerular Filtration Rate >90 >=60    BUN/Cr 15 7 - 25    Calcium 8.7 8.4 - 10.2 mg/dL    Bilirubin, Total 0.4 0.2 - 1.0 mg/dL    GOT/AST 40 (H) <=37 Units/L    GPT/ALT 15 <64 Units/L    Alkaline Phosphatase 151 (H) 45 - 117 Units/L    Albumin 2.6 (L) 3.4 - 5.0 g/dL    Protein, Total 8.7 (H) 6.4 - 8.2 g/dL    Globulin 6.1 (H) 2.0 - 4.0 g/dL    A/G Ratio 0.4 (L) 1.0 - 2.4   Prothrombin Time (INR/PT)    Specimen: Blood, Venous   Result Value Ref Range    Protime- PT 12.2 (H) 9.7 - 11.8 sec    INR 1.2     Magnesium    Specimen: Blood, Venous   Result Value Ref Range    Magnesium 1.8 1.7 - 2.4 mg/dL   Alcohol    Specimen: Blood, Venous   Result Value Ref Range    Alcohol 9 (H) <3 mg/dL   CBC with Automated Differential (performable only)    Specimen: Blood, Venous   Result Value Ref Range    WBC 4.1 (L) 4.2 - 11.0 K/mcL    RBC 3.32 (L) 4.50 - 5.90 mil/mcL    HGB 9.9 (L) 13.0 - 17.0 g/dL    HCT 31.3 (L) 39.0 - 51.0 %    MCV 94.3 78.0 - 100.0 fl    MCH 29.8 26.0 - 34.0 pg    MCHC 31.6 (L) 32.0 - 36.5 g/dL    RDW-CV 14.8 11.0 - 15.0 %    RDW-SD 51.1 (H) 39.0 - 50.0 fL     140 - 450 K/mcL    NRBC 0 <=0 /100 WBC    Neutrophil, Percent 74 %    Lymphocytes, Percent 18 %    Mono, Percent 7 %    Eosinophils, Percent 1 %    Basophils, Percent 0 %    Immature Granulocytes 0 %    Absolute Neutrophils 3.0 1.8 - 7.7 K/mcL    Absolute Lymphocytes 0.8 (L) 1.0 - 4.0 K/mcL    Absolute Monocytes 0.3 0.3 - 0.9 K/mcL    Absolute Eosinophils  0.1 0.0 - 0.5 K/mcL    Absolute Basophils 0.0 0.0 - 0.3 K/mcL    Absolute Immature Granulocytes 0.0 0.0 - 0.2 K/mcL          EKG     No EKG performed    Radiology Results    ED Radiology Results     Imaging Results              XR KNEE 3 VIEW LEFT (Final result)  Result time 11/05/24 16:37:05      Final result                   Impression:    IMPRESSION:    1.  No acute osseous abnormality.  2.  Mild to moderate suprapatellar effusion and soft tissue swelling  anterior  to the distal femur.    I, Attending Radiologist Karma Rodriguez MD, have reviewed the images and  report and concur with these findings interpreted by Resident Radiologist,  Jack Scales MD.    Electronically Signed by: Karma Rodriguez MD  Signed on: 11/5/2024 4:37 PM  Created on Workstation ID: [id number]  Signed on Workstation ID: [id number]               Narrative:    XR KNEE 3 VIEW LEFT     HISTORY: left knee pain with suprapatellar bursal enlargement on US    COMPARISON: Left lower extremity venous ultrasound performed the same day.    TECHNIQUE: 3 views of the left knee.    FINDINGS:    No acute fracture or malalignment. Area of smooth periosteal reaction along  the medial proximal tibial metadiaphysis, possibly related to remote  injury. Quadriceps and patellar tendon insertional enthesophytes. Mild to  moderate suprapatellar effusion. Soft tissue swelling anterior to the  distal femur.                        Preliminary result                   Impression:    IMPRESSION:    1.  No acute osseous abnormality.  2.  Mild to moderate suprapatellar bursal effusion and soft tissue swelling  anterior to the distal femur.    * * * * * * * * * * * * * * PRELIMINARY REPORT * * * * * * * * * * * * * *                    Narrative:        * * * * * * * * * * * * * * PRELIMINARY REPORT * * * * * * * * * * * * * *     XR KNEE 3 VIEW LEFT     HISTORY: left knee pain with suprapatellar bursal enlargement on US    COMPARISON: Left lower extremity venous ultrasound performed the same day.    TECHNIQUE: 3 views of the left knee.    FINDINGS:    No acute fracture or malalignment. Area of smooth periosteal reaction along  the medial proximal tibial metadiaphysis, possibly related to remote  injury. Quadriceps and patellar tendon insertional enthesophytes. Mild to  moderate suprapatellar bursal effusion. Soft tissue swelling anterior to  the distal femur.                                       US VASC LOWER EXTREMITY VENOUS  DUPLEX LEFT (Final result)  Result time 11/05/24 15:28:56      Final result                   Impression:    IMPRESSION:    1.  Increased extent of now occlusive thrombus within the left popliteal  vein compared to 9/22/2024 exam.  2.  Moderate suprapatellar bursal effusion, with heterogeneity which may  suggest synovitis.    Findings are conveyed verbally by phone to CHARLEEN KAY MD on 11/5/2024  3:23 PM by Jack Scales MD.     I, Attending Radiologist Karma Rodriguez MD, have reviewed the images and  report and concur with these findings interpreted by Resident Radiologist,  Jack Scales MD.    Electronically Signed by: Karma Rodriguez MD  Signed on: 11/5/2024 3:28 PM  Created on Workstation ID: [id number]  Signed on Workstation ID: [id number]               Narrative:    US [facility] LOWER EXTREMITY VENOUS DUPLEX LEFT     HISTORY: Diagnosed in September with nonocclusive DVT left leg, now with  increased pain and swelling    COMPARISON: Bilateral lower extremity venous duplex ultrasound 9/22/2024.    TECHNIQUE: Grayscale, color Doppler, and spectral duplex images of the left  lower extremity venous system and the contralateral common femoral vein.    FINDINGS:    Hypoechoic thrombus is visualized within the proximal left popliteal vein,  with increased distal extent compared to 9/22/2024 exam. The proximal  popliteal vein is partially compressible while the distal popliteal vein is  noncompressible. No significant Doppler flow in the distal popliteal vein.    The remainder of the visualized deep venous structures of the left lower  extremity demonstrate normal compressibility, augmentation, and color and  pulsed Doppler flow without intraluminal thrombus.     The left posterior tibialis and peroneal veins were patent on color Doppler  and grayscale imaging.     The contralateral common femoral vein is patent.    A moderate fluid collection superomedial to the patella is noted, likely  within the suprapatellar  bursa, with some internal heterogeneity.                        Preliminary result                   Impression:    IMPRESSION:    1.  Increased extent of now occlusive thrombus within the left popliteal  vein compared to 9/22/2024 exam.  2.  Moderate suprapatellar bursal effusion, with heterogeneity which may  suggest synovitis.    Findings are conveyed verbally by phone to CHARLEEN KAY MD on 11/5/2024  3:23 PM by Jack Scales MD.     * * * * * * * * * * * * * * PRELIMINARY REPORT * * * * * * * * * * * * * *                    Narrative:        * * * * * * * * * * * * * * PRELIMINARY REPORT * * * * * * * * * * * * * *     [facility] LOWER EXTREMITY VENOUS DUPLEX LEFT     HISTORY: Diagnosed in September with nonocclusive DVT left leg, now with  increased pain and swelling    COMPARISON: Bilateral lower extremity venous duplex ultrasound 9/22/2024.    TECHNIQUE: Grayscale, color Doppler, and spectral duplex images of the left  lower extremity venous system and the contralateral common femoral vein.    FINDINGS:    Hypoechoic thrombus is visualized within the proximal left popliteal vein,  with increased distal extent compared to 9/22/2024 exam. The proximal  popliteal vein is partially compressible while the distal popliteal vein is  noncompressible. No significant Doppler flow in the distal popliteal vein.    The remainder of the visualized deep venous structures of the left lower  extremity demonstrate normal compressibility, augmentation, and color and  pulsed Doppler flow without intraluminal thrombus.     The left posterior tibialis and peroneal veins were patent on color Doppler  and grayscale imaging.     The contralateral common femoral vein is patent.    A moderate fluid collection superomedial to the patella is noted, likely  within the suprapatellar bursa, with some internal heterogeneity.                                          ED Medications   ED Medications     Medications   HYDROcodone-acetaminophen  (NORCO) 5-325 MG per tablet 1 tablet (1 tablet Oral Given 11/5/24 1310)          ED Course     Vitals:    11/05/24 1324 11/05/24 1333 11/05/24 1540 11/05/24 1709   BP: 134/85 124/85 122/74 116/79   Pulse: 93 89 88 93   Resp: 18 16 16 16   Temp:       TempSrc:       SpO2: 100% 98% 99% 98%       ED Course as of 11/05/24 1837   Tue Nov 05, 2024   1616 Due to a boarding crisis, patient has been in the waiting room, staff is just now placed him in a hallway recliner and I updated the patient on his lab and imaging results.  I explained that his ultrasound reveals occlusive popliteal vein clot, similar to previous though more occlusive.  Encouraged compliance with the Eliquis, advised patient to raise and elevate the leg is much as possible, also discussed the finding of suprapatellar bursitis, and given his medial knee pain and tenderness as well, I suspect an anserine bursitis as well.  Patient is in much better spirits, much less pain at this time, able to talk and converse and even smile at this time, still with medial knee tenderness.  X-ray has just been performed, discussed the plan to update him once I have the x-ray results, will provide a walker, Ortho follow-up, and I stressed the importance of outpatient follow-up with the liver clinic given his known hepatocellular carcinoma as well as cirrhosis. [RL]   1624 I have independently interpreted the Xray of the Left knee and have found No Fracture. I am awaiting on the final radiology read.   [RL]      ED Course User Index  [RL] Krystina Smith MD       Independent Review Completed in ED course        Consults                    Medical Decision Making                          Patient with known history of nonocclusive DVT as well as PE diagnosed in September, on Eliquis with reported compliance however left leg is newly more swollen and painful.  Concern for DVT, abdomen is distended consistent with known history of ascites.  Screening labs given known history  of cirrhosis to assess for electrolyte derangements, hepatorenal syndrome, and ultrasound of the left lower extremity ordered.  The patient will not require admission.  Popliteal DVT, now more occlusive, patient reports compliance with his Eliquis, encouraged leg elevation both for the bursitis as well as for the DVT.  No leukocytosis, no fever, no erythema to suggest infected bursitis, suspect both suprapatellar and anserine bursitis just based on the location of his pain and tenderness.  He did report having had to clean the bathroom the other day, may have caused exacerbation of this with that kneeling and squatting work.  Advised ice, elevation, Ortho follow-up if no improvement, no NSAIDs for now given Eliquis use.        Does the Patient have sepsis: NO     Critical Care       No Critical Care        Disposition       Clinical Impression and Diagnosis  6:37 PM       ED Diagnoses       Diagnosis Comment Associated Orders       Final diagnoses    Suprapatellar bursitis of left knee -- HYDROCODONE-ACETAMINOPHEN 5-325 MG PO TABS      Anserine bursitis -- HYDROCODONE-ACETAMINOPHEN 5-325 MG PO TABS      Chronic deep vein thrombosis (DVT) of popliteal vein of left lower extremity  (CMD) -- --            Follow Up:  Sun Shea, APAUNDREA  [address] [facility] [address]  [phone]      Call to schedule follow up appointment    Ayo Blanco MD  [address]  [facility] [address]  [facility] [address]  [phone]    In 1 day  Call to schedule follow up appointment          Summary of your Discharge Medications        Take these Medications        Details   HYDROcodone-acetaminophen 5-325 MG per tablet  Commonly known as: NORCO   Take 1 tablet by mouth every 6 hours as needed for Pain.              Pt is discharged to home/self care in stable condition.              Discharge after Treatment 11/5/2024  4:40 PM  Donell        I have reviewed the information recorded by the scribe for accuracy and agree  with its contents.    ____________________________________________________________________    Eric Kelsey acting as a scribe for Dr. Krystina Smith  Dictation # [id number]  Scribe: Eric Kelsey       Note has been documented by Eric Kelsey on 11/5/2024     Krystina Smith MD  11/05/24 1837

## (undated) DEVICE — TROCARS: Brand: KII® BALLOON BLUNT TIP SYSTEM

## (undated) DEVICE — GLOVE INDICATOR PI UNDERGLOVE SZ 8 BLUE

## (undated) DEVICE — VIAL DECANTER

## (undated) DEVICE — ELECTRODE BLADE MOD E-Z CLEAN 2.5IN 6.4CM -0012M

## (undated) DEVICE — BETHLEHEM UNIVERSAL MINOR GEN: Brand: CARDINAL HEALTH

## (undated) DEVICE — TROCAR: Brand: KII FIOS FIRST ENTRY

## (undated) DEVICE — INTENDED FOR TISSUE SEPARATION, AND OTHER PROCEDURES THAT REQUIRE A SHARP SURGICAL BLADE TO PUNCTURE OR CUT.: Brand: BARD-PARKER SAFETY BLADES SIZE 11, STERILE

## (undated) DEVICE — SCD SEQUENTIAL COMPRESSION COMFORT SLEEVE MEDIUM KNEE LENGTH: Brand: KENDALL SCD

## (undated) DEVICE — PENCIL ELECTROSURG E-Z CLEAN -0035H

## (undated) DEVICE — PAD GROUNDING ADULT

## (undated) DEVICE — ENDOPATH ETS-FLEX45 ARTICULATING ENDOSCOPIC LINEAR CUTTER, NO RELOAD: Brand: ENDOPATH

## (undated) DEVICE — GLOVE SRG BIOGEL ECLIPSE 8

## (undated) DEVICE — ADHESIVE SKIN HIGH VISCOSITY EXOFIN 1ML

## (undated) DEVICE — TISSUE RETRIEVAL SYSTEM: Brand: INZII RETRIEVAL SYSTEM

## (undated) DEVICE — HARMONIC ACE 5MM DIAMETER SHEARS 36CM SHAFT LENGTH + ADAPTIVE TISSUE TECHNOLOGY FOR USE WITH GENERATOR G11: Brand: HARMONIC ACE

## (undated) DEVICE — MEDI-VAC YANKAUER SUCTION HANDLE W/BULBOUS AND CONTROL VENT: Brand: CARDINAL HEALTH

## (undated) DEVICE — INTENDED FOR TISSUE SEPARATION, AND OTHER PROCEDURES THAT REQUIRE A SHARP SURGICAL BLADE TO PUNCTURE OR CUT.: Brand: BARD-PARKER SAFETY BLADES SIZE 15, STERILE

## (undated) DEVICE — TROCAR: Brand: KII® SLEEVE

## (undated) DEVICE — SUT MONOCRYL 4-0 PS-2 27 IN Y426H

## (undated) DEVICE — NEEDLE 25G X 1 1/2

## (undated) DEVICE — SUT VICRYL 3-0 SH 27 IN J416H

## (undated) DEVICE — GLOVE INDICATOR PI UNDERGLOVE SZ 6.5 BLUE

## (undated) DEVICE — GLOVE SRG BIOGEL 6.5

## (undated) DEVICE — ETS45 RELOAD STANDARD 45MM: Brand: ENDOPATH

## (undated) DEVICE — IRRIG ENDO FLO TUBING

## (undated) DEVICE — SUT VICRYL 0 UR-6 27 IN J603H

## (undated) DEVICE — SUT PROLENE 2-0 CT-2 30 IN 8411H

## (undated) DEVICE — CHLORAPREP HI-LITE 26ML ORANGE

## (undated) DEVICE — DRAPE EQUIPMENT RF WAND

## (undated) DEVICE — ALLENTOWN LAP CHOLE APP PACK: Brand: CARDINAL HEALTH

## (undated) DEVICE — TUBING SUCTION 5MM X 12 FT